# Patient Record
Sex: FEMALE | Race: WHITE | Employment: OTHER | URBAN - METROPOLITAN AREA
[De-identification: names, ages, dates, MRNs, and addresses within clinical notes are randomized per-mention and may not be internally consistent; named-entity substitution may affect disease eponyms.]

---

## 2019-11-04 ENCOUNTER — TELEPHONE (OUTPATIENT)
Dept: GASTROENTEROLOGY | Age: 84
End: 2019-11-04

## 2019-11-04 DIAGNOSIS — R19.4 CHANGE IN BOWEL HABIT: Primary | ICD-10-CM

## 2019-11-04 RX ORDER — SODIUM, POTASSIUM,MAG SULFATES 17.5-3.13G
SOLUTION, RECONSTITUTED, ORAL ORAL
Qty: 2 BOTTLE | Refills: 0 | Status: ON HOLD | OUTPATIENT
Start: 2019-11-04 | End: 2019-11-20 | Stop reason: HOSPADM

## 2019-11-20 ENCOUNTER — ANESTHESIA EVENT (OUTPATIENT)
Dept: OPERATING ROOM | Age: 84
End: 2019-11-20
Payer: MEDICARE

## 2019-11-20 ENCOUNTER — ANESTHESIA (OUTPATIENT)
Dept: OPERATING ROOM | Age: 84
End: 2019-11-20
Payer: MEDICARE

## 2019-11-20 ENCOUNTER — HOSPITAL ENCOUNTER (OUTPATIENT)
Age: 84
Setting detail: OUTPATIENT SURGERY
Discharge: HOME | End: 2019-11-20
Attending: INTERNAL MEDICINE | Admitting: INTERNAL MEDICINE
Payer: MEDICARE

## 2019-11-20 VITALS
OXYGEN SATURATION: 97 % | RESPIRATION RATE: 22 BRPM | DIASTOLIC BLOOD PRESSURE: 58 MMHG | SYSTOLIC BLOOD PRESSURE: 115 MMHG

## 2019-11-20 VITALS
BODY MASS INDEX: 21.97 KG/M2 | HEART RATE: 89 BPM | DIASTOLIC BLOOD PRESSURE: 64 MMHG | WEIGHT: 109 LBS | OXYGEN SATURATION: 98 % | RESPIRATION RATE: 18 BRPM | HEIGHT: 59 IN | TEMPERATURE: 97.8 F | SYSTOLIC BLOOD PRESSURE: 114 MMHG

## 2019-11-20 LAB — GLUCOSE BLD-MCNC: 164 MG/DL (ref 74–100)

## 2019-11-20 PROCEDURE — 2709999900 HC NON-CHARGEABLE SUPPLY: Performed by: INTERNAL MEDICINE

## 2019-11-20 PROCEDURE — 7100000011 HC PHASE II RECOVERY - ADDTL 15 MIN: Performed by: INTERNAL MEDICINE

## 2019-11-20 PROCEDURE — 45380 COLONOSCOPY AND BIOPSY: CPT | Performed by: INTERNAL MEDICINE

## 2019-11-20 PROCEDURE — 2500000003 HC RX 250 WO HCPCS: Performed by: NURSE ANESTHETIST, CERTIFIED REGISTERED

## 2019-11-20 PROCEDURE — 82947 ASSAY GLUCOSE BLOOD QUANT: CPT

## 2019-11-20 PROCEDURE — 88305 TISSUE EXAM BY PATHOLOGIST: CPT

## 2019-11-20 PROCEDURE — 2580000003 HC RX 258: Performed by: INTERNAL MEDICINE

## 2019-11-20 PROCEDURE — 7100000010 HC PHASE II RECOVERY - FIRST 15 MIN: Performed by: INTERNAL MEDICINE

## 2019-11-20 PROCEDURE — 3700000000 HC ANESTHESIA ATTENDED CARE: Performed by: INTERNAL MEDICINE

## 2019-11-20 PROCEDURE — 3700000001 HC ADD 15 MINUTES (ANESTHESIA): Performed by: INTERNAL MEDICINE

## 2019-11-20 PROCEDURE — 3609010600 HC COLONOSCOPY POLYPECTOMY SNARE/COLD BIOPSY: Performed by: INTERNAL MEDICINE

## 2019-11-20 PROCEDURE — 6360000002 HC RX W HCPCS: Performed by: NURSE ANESTHETIST, CERTIFIED REGISTERED

## 2019-11-20 RX ORDER — PROPOFOL 10 MG/ML
INJECTION, EMULSION INTRAVENOUS CONTINUOUS PRN
Status: DISCONTINUED | OUTPATIENT
Start: 2019-11-20 | End: 2019-11-20 | Stop reason: SDUPTHER

## 2019-11-20 RX ORDER — LIDOCAINE HYDROCHLORIDE 20 MG/ML
INJECTION, SOLUTION EPIDURAL; INFILTRATION; INTRACAUDAL; PERINEURAL PRN
Status: DISCONTINUED | OUTPATIENT
Start: 2019-11-20 | End: 2019-11-20 | Stop reason: SDUPTHER

## 2019-11-20 RX ORDER — SODIUM CHLORIDE, SODIUM LACTATE, POTASSIUM CHLORIDE, CALCIUM CHLORIDE 600; 310; 30; 20 MG/100ML; MG/100ML; MG/100ML; MG/100ML
INJECTION, SOLUTION INTRAVENOUS CONTINUOUS
Status: DISCONTINUED | OUTPATIENT
Start: 2019-11-20 | End: 2019-11-20 | Stop reason: HOSPADM

## 2019-11-20 RX ORDER — PROPOFOL 10 MG/ML
INJECTION, EMULSION INTRAVENOUS PRN
Status: DISCONTINUED | OUTPATIENT
Start: 2019-11-20 | End: 2019-11-20 | Stop reason: SDUPTHER

## 2019-11-20 RX ADMIN — SODIUM CHLORIDE, POTASSIUM CHLORIDE, SODIUM LACTATE AND CALCIUM CHLORIDE: 600; 310; 30; 20 INJECTION, SOLUTION INTRAVENOUS at 11:02

## 2019-11-20 RX ADMIN — PROPOFOL 40 MG: 10 INJECTION, EMULSION INTRAVENOUS at 12:20

## 2019-11-20 RX ADMIN — PROPOFOL 150 MCG/KG/MIN: 10 INJECTION, EMULSION INTRAVENOUS at 12:20

## 2019-11-20 RX ADMIN — LIDOCAINE HYDROCHLORIDE 50 MG: 20 INJECTION, SOLUTION EPIDURAL; INFILTRATION; INTRACAUDAL at 12:20

## 2019-11-20 ASSESSMENT — PAIN SCALES - GENERAL
PAINLEVEL_OUTOF10: 0

## 2019-11-22 LAB — SURGICAL PATHOLOGY REPORT: NORMAL

## 2020-01-14 ENCOUNTER — OFFICE VISIT (OUTPATIENT)
Dept: GASTROENTEROLOGY | Age: 85
End: 2020-01-14
Payer: MEDICARE

## 2020-01-14 VITALS
BODY MASS INDEX: 19.8 KG/M2 | WEIGHT: 98.2 LBS | RESPIRATION RATE: 18 BRPM | SYSTOLIC BLOOD PRESSURE: 129 MMHG | HEART RATE: 75 BPM | DIASTOLIC BLOOD PRESSURE: 78 MMHG | HEIGHT: 59 IN | TEMPERATURE: 98.7 F

## 2020-01-14 PROCEDURE — 1090F PRES/ABSN URINE INCON ASSESS: CPT | Performed by: INTERNAL MEDICINE

## 2020-01-14 PROCEDURE — 99215 OFFICE O/P EST HI 40 MIN: CPT | Performed by: INTERNAL MEDICINE

## 2020-01-14 PROCEDURE — 1036F TOBACCO NON-USER: CPT | Performed by: INTERNAL MEDICINE

## 2020-01-14 PROCEDURE — G8400 PT W/DXA NO RESULTS DOC: HCPCS | Performed by: INTERNAL MEDICINE

## 2020-01-14 PROCEDURE — G8482 FLU IMMUNIZE ORDER/ADMIN: HCPCS | Performed by: INTERNAL MEDICINE

## 2020-01-14 PROCEDURE — G8427 DOCREV CUR MEDS BY ELIG CLIN: HCPCS | Performed by: INTERNAL MEDICINE

## 2020-01-14 PROCEDURE — G8420 CALC BMI NORM PARAMETERS: HCPCS | Performed by: INTERNAL MEDICINE

## 2020-01-14 PROCEDURE — 1123F ACP DISCUSS/DSCN MKR DOCD: CPT | Performed by: INTERNAL MEDICINE

## 2020-01-14 PROCEDURE — 4040F PNEUMOC VAC/ADMIN/RCVD: CPT | Performed by: INTERNAL MEDICINE

## 2020-01-14 RX ORDER — SIMETHICONE 80 MG
80 TABLET,CHEWABLE ORAL
COMMUNITY
End: 2021-07-06

## 2020-01-14 RX ORDER — DICYCLOMINE HYDROCHLORIDE 10 MG/1
10 CAPSULE ORAL 2 TIMES DAILY PRN
COMMUNITY
End: 2021-07-06

## 2020-01-14 NOTE — PROGRESS NOTES
except as mentioned above, was negative or noncontributory  Allergy and Immunology:  Completed and, except as mentioned above, was negative or noncontributory  Hematological and Lymphatic:  Completed and, except as mentioned above, was negative or noncontributory  Endocrine: Completed and, except as mentioned above, was negative or noncontributory  Breast:  Completed and, except as mentioned above, was negative or noncontributory  Respiratory:  Completed and, except as mentioned above, was negative or noncontributory  Cardiovascular:  Completed and, except as mentioned above, was negative or noncontributory  Gastrointestinal: See HPI  Genito-Urinary:  Completed and, except as mentioned above, was negative or noncontributory  Musculoskeletal:  Completed and, except as mentioned above, was negative or noncontributory  Neurological:  Completed and, except as mentioned above, was negative or noncontributory  Dermatological:  Completed and, except as mentioned above, was negative or noncontributory      Physical exam:  Constitutional - Well-developed, well-nourished, in no acute distress. Vital signs, height and weight are as documented above.     Mental Status /Psychiatric - alert, oriented to person, place, and time, flat affect with depressed appearance, behavior, speech, dress, motor activity, and thought processes    Eyes - pupils equal and reactive, extraocular eye movements intact, sclera anicteric, conjunctiva and eyelids normal    Neck - supple, no significant adenopathy, trachea midline, thyroid not enlarged     Respiratory - clear to auscultation, no wheezes, rales or rhonchi, symmetric air entry, no tachypnea, retractions or cyanosis, no evidence of increased respiratory effort     Cardiovascular - normal rate, regular rhythm, normal S1, S2, no murmurs, rubs, clicks or gallops, normal bilateral carotid upstroke without bruits, no JVD     Gastrointestinal - soft, nontender, nondistended, no masses or organomegaly, bowel sounds normal, no hernias noted     Musculoskeletal - no joint tenderness, deformity or swelling, no muscular tenderness noted, normal range of motion     Extremities - peripheral pulses normal, no pedal edema, no clubbing or cyanosis     Skin - normal coloration and turgor, no rashes, no suspicious skin lesions noted    This lady has multiple gastrointestinal symptoms as described above which could be due to her hiatal hernia, gastroesophageal reflux disease, peptic ulcer disease, neoplasm, partial gastric outlet obstruction or small intestinal pathology. She admits to depression which she attributes to her ongoing health issues but which may be playing a significant role in her symptoms, in my opinion. She also has a number of non-GI symptoms and was noted to have hyponatremia on her blood collection dated 11/18/2019. I will obviously defer any further evaluation in this regard to her PCP. From a GI standpoint, we will go ahead with EGD and CT enterography at this point. Alternatives, risks and benefits of this approach were discussed with the patient who understands and agrees. Please note that portions of this note were completed with a voice recognition program. Efforts were made to edit the dictation but occasionally words are mis-transcribed.

## 2020-01-14 NOTE — PATIENT INSTRUCTIONS
SURVEY:    You may be receiving a survey from [facility] regarding your visit today. Please complete the survey to enable us to provide the highest quality of care to you and your family. If you cannot score us a very good on any question, please call the office to discuss how we could have made your experience a very good one. Thank you.

## 2020-01-17 PROBLEM — R10.10 PAIN OF UPPER ABDOMEN: Status: ACTIVE | Noted: 2020-01-17

## 2020-01-17 PROBLEM — R11.0 NAUSEA: Status: ACTIVE | Noted: 2020-01-17

## 2020-01-17 PROBLEM — R63.4 WEIGHT LOSS: Status: ACTIVE | Noted: 2020-01-17

## 2020-01-20 ENCOUNTER — ANESTHESIA EVENT (OUTPATIENT)
Dept: OPERATING ROOM | Age: 85
End: 2020-01-20
Payer: MEDICARE

## 2020-01-20 ENCOUNTER — ANESTHESIA (OUTPATIENT)
Dept: OPERATING ROOM | Age: 85
End: 2020-01-20
Payer: MEDICARE

## 2020-01-20 ENCOUNTER — HOSPITAL ENCOUNTER (OUTPATIENT)
Age: 85
Setting detail: OUTPATIENT SURGERY
Discharge: HOME | End: 2020-01-20
Attending: INTERNAL MEDICINE | Admitting: INTERNAL MEDICINE
Payer: MEDICARE

## 2020-01-20 VITALS
DIASTOLIC BLOOD PRESSURE: 50 MMHG | WEIGHT: 98 LBS | SYSTOLIC BLOOD PRESSURE: 115 MMHG | OXYGEN SATURATION: 99 % | TEMPERATURE: 97.8 F | HEART RATE: 73 BPM | HEIGHT: 60 IN | RESPIRATION RATE: 16 BRPM | BODY MASS INDEX: 19.24 KG/M2

## 2020-01-20 VITALS
OXYGEN SATURATION: 100 % | SYSTOLIC BLOOD PRESSURE: 143 MMHG | RESPIRATION RATE: 23 BRPM | DIASTOLIC BLOOD PRESSURE: 64 MMHG

## 2020-01-20 LAB — GLUCOSE BLD-MCNC: 117 MG/DL (ref 74–100)

## 2020-01-20 PROCEDURE — 82947 ASSAY GLUCOSE BLOOD QUANT: CPT

## 2020-01-20 PROCEDURE — 7100000011 HC PHASE II RECOVERY - ADDTL 15 MIN: Performed by: INTERNAL MEDICINE

## 2020-01-20 PROCEDURE — 43239 EGD BIOPSY SINGLE/MULTIPLE: CPT | Performed by: INTERNAL MEDICINE

## 2020-01-20 PROCEDURE — 88305 TISSUE EXAM BY PATHOLOGIST: CPT

## 2020-01-20 PROCEDURE — 2580000003 HC RX 258: Performed by: INTERNAL MEDICINE

## 2020-01-20 PROCEDURE — 2709999900 HC NON-CHARGEABLE SUPPLY: Performed by: INTERNAL MEDICINE

## 2020-01-20 PROCEDURE — 3700000001 HC ADD 15 MINUTES (ANESTHESIA): Performed by: INTERNAL MEDICINE

## 2020-01-20 PROCEDURE — 6360000002 HC RX W HCPCS: Performed by: NURSE ANESTHETIST, CERTIFIED REGISTERED

## 2020-01-20 PROCEDURE — 7100000010 HC PHASE II RECOVERY - FIRST 15 MIN: Performed by: INTERNAL MEDICINE

## 2020-01-20 PROCEDURE — 2500000003 HC RX 250 WO HCPCS: Performed by: NURSE ANESTHETIST, CERTIFIED REGISTERED

## 2020-01-20 PROCEDURE — 3700000000 HC ANESTHESIA ATTENDED CARE: Performed by: INTERNAL MEDICINE

## 2020-01-20 PROCEDURE — 3609012400 HC EGD TRANSORAL BIOPSY SINGLE/MULTIPLE: Performed by: INTERNAL MEDICINE

## 2020-01-20 RX ORDER — PROPOFOL 10 MG/ML
INJECTION, EMULSION INTRAVENOUS PRN
Status: DISCONTINUED | OUTPATIENT
Start: 2020-01-20 | End: 2020-01-20 | Stop reason: SDUPTHER

## 2020-01-20 RX ORDER — SODIUM CHLORIDE, SODIUM LACTATE, POTASSIUM CHLORIDE, CALCIUM CHLORIDE 600; 310; 30; 20 MG/100ML; MG/100ML; MG/100ML; MG/100ML
INJECTION, SOLUTION INTRAVENOUS CONTINUOUS
Status: DISCONTINUED | OUTPATIENT
Start: 2020-01-20 | End: 2020-01-20 | Stop reason: HOSPADM

## 2020-01-20 RX ORDER — LIDOCAINE HYDROCHLORIDE 20 MG/ML
INJECTION, SOLUTION EPIDURAL; INFILTRATION; INTRACAUDAL; PERINEURAL PRN
Status: DISCONTINUED | OUTPATIENT
Start: 2020-01-20 | End: 2020-01-20 | Stop reason: SDUPTHER

## 2020-01-20 RX ADMIN — SODIUM CHLORIDE, POTASSIUM CHLORIDE, SODIUM LACTATE AND CALCIUM CHLORIDE: 600; 310; 30; 20 INJECTION, SOLUTION INTRAVENOUS at 08:14

## 2020-01-20 RX ADMIN — PROPOFOL 80 MG: 10 INJECTION, EMULSION INTRAVENOUS at 08:54

## 2020-01-20 RX ADMIN — LIDOCAINE HYDROCHLORIDE 100 MG: 20 INJECTION, SOLUTION EPIDURAL; INFILTRATION; INTRACAUDAL at 08:54

## 2020-01-20 ASSESSMENT — PAIN SCALES - GENERAL
PAINLEVEL_OUTOF10: 0

## 2020-01-20 NOTE — H&P
attempt to quit: 1968     Years since quittin.4    Smokeless tobacco: Never Used   Substance and Sexual Activity    Alcohol use: Yes     Alcohol/week: 1.0 standard drinks     Types: 1 Glasses of wine per week     Comment: rarely    Drug use: No    Sexual activity: Not on file   Lifestyle    Physical activity:     Days per week: Not on file     Minutes per session: Not on file    Stress: Not on file   Relationships    Social connections:     Talks on phone: Not on file     Gets together: Not on file     Attends [employer] service: Not on file     Active member of club or organization: Not on file     Attends meetings of clubs or organizations: Not on file     Relationship status: Not on file    Intimate partner violence:     Fear of current or ex partner: Not on file     Emotionally abused: Not on file     Physically abused: Not on file     Forced sexual activity: Not on file   Other Topics Concern    Not on file   Social History Narrative    Not on file     ROS: Non-contributory    Physical Exam:  Vitals:    20 0800   BP: (!) 128/58   Pulse: 60   Resp: 18   Temp: 98.9 °F (37.2 °C)   SpO2: 99%       Chest: Breath sounds were clear and equal with no rales, wheezes, or rhonchi. Respiratory effort was normal with no retractions or use of accessory muscles. Cardiovascular: Heart sounds were normal with a regular rate and rhythm. There were no murmurs, gallops or rubs. Abdomen: Bowel sounds were normal.  The abdomen was soft and non distended. There was no tenderness, guarding, rebound, or rigidity. There were no masses, hepatosplenomegaly, or hernias.     Plan: egd      Electronically by Desmond Collado MD  on 2020 at 8:34 AM

## 2020-01-20 NOTE — ANESTHESIA PRE PROCEDURE
Department of Anesthesiology  Preprocedure Note       Name:  Gretchen Taveras   Age:  81 y.o.  :  1935                                          MRN:  [id number]         Date:  2020      Surgeon: Raul Munozer):  Bebeto Askew MD    Procedure: EGD ESOPHAGOGASTRODUODENOSCOPY (N/A )    Medications prior to admission:   Prior to Admission medications    Medication Sig Start Date End Date Taking?  Authorizing Provider   dicyclomine (BENTYL) 10 MG capsule Take 10 mg by mouth 2 times daily as needed   Yes Historical Provider, MD   simethicone (MYLICON) 80 MG chewable tablet Take 80 mg by mouth 3 times daily (before meals)   Yes Historical Provider, MD   Probiotic Product (PROBIOTIC-10 PO) Take by mouth daily   Yes Historical Provider, MD   Ondansetron HCl (ZOFRAN PO) Take by mouth as needed   Yes Historical Provider, MD   metFORMIN (GLUCOPHAGE) 1000 MG tablet Take 1,000 mg by mouth 2 times daily   Yes Historical Provider, MD   indapamide (LOZOL) 1.25 MG tablet Take 1.25 mg by mouth daily   Yes Historical Provider, MD   sitaGLIPtin (JANUVIA) 100 MG tablet Take 100 mg by mouth daily   Yes Historical Provider, MD   Omeprazole (PRILOSEC PO) Take 40 mg by mouth 2 times daily    Yes Historical Provider, MD   amLODIPine-benazepril (LOTREL) 5-10 MG per capsule Take 1 capsule by mouth daily   Yes Historical Provider, MD   simvastatin (ZOCOR) 20 MG tablet Take 20 mg by mouth nightly   Yes Historical Provider, MD   metoprolol (LOPRESSOR) 50 MG tablet Take 50 mg by mouth 2 times daily   Yes Historical Provider, MD   psyllium 0.52 g capsule Take 0.52 g by mouth Takes 2 caps daily    Historical Provider, MD   diphenoxylate-atropine (LOMOTIL) 2.5-0.025 MG per tablet Take 1 tablet by mouth 3 times daily as needed for Diarrhea    Historical Provider, MD       Current medications:    Current Facility-Administered Medications   Medication Dose Route Frequency Provider Last Rate Last Dose    lactated ringers infusion   Intravenous blood products discussed with patient whom consented to blood products. Plan discussed with CRNA.                   Diann Kuhn, PONCE - CRNA   1/20/2020

## 2020-01-22 LAB — SURGICAL PATHOLOGY REPORT: NORMAL

## 2020-01-24 ENCOUNTER — HOSPITAL ENCOUNTER (OUTPATIENT)
Dept: CT IMAGING | Age: 85
Discharge: HOME | End: 2020-01-26
Payer: MEDICARE

## 2020-01-24 ENCOUNTER — HOSPITAL ENCOUNTER (OUTPATIENT)
Dept: LAB | Age: 85
Discharge: HOME | End: 2020-01-24
Payer: MEDICARE

## 2020-01-24 LAB
BUN BLDV-MCNC: 12 MG/DL (ref 8–23)
CREAT SERPL-MCNC: 0.83 MG/DL (ref 0.5–0.9)
GFR AFRICAN AMERICAN: >60 ML/MIN
GFR NON-AFRICAN AMERICAN: >60 ML/MIN
GFR SERPL CREATININE-BSD FRML MDRD: NORMAL ML/MIN/{1.73_M2}
GFR SERPL CREATININE-BSD FRML MDRD: NORMAL ML/MIN/{1.73_M2}

## 2020-01-24 PROCEDURE — 82565 ASSAY OF CREATININE: CPT

## 2020-01-24 PROCEDURE — 2500000003 HC RX 250 WO HCPCS: Performed by: INTERNAL MEDICINE

## 2020-01-24 PROCEDURE — 74177 CT ABD & PELVIS W/CONTRAST: CPT

## 2020-01-24 PROCEDURE — 6360000004 HC RX CONTRAST MEDICATION: Performed by: INTERNAL MEDICINE

## 2020-01-24 PROCEDURE — 36415 COLL VENOUS BLD VENIPUNCTURE: CPT

## 2020-01-24 PROCEDURE — 84520 ASSAY OF UREA NITROGEN: CPT

## 2020-01-24 RX ADMIN — BARIUM SULFATE 1300 ML: 1 SUSPENSION ORAL at 13:29

## 2020-01-24 RX ADMIN — IOPAMIDOL 75 ML: 755 INJECTION, SOLUTION INTRAVENOUS at 13:29

## 2020-01-30 ENCOUNTER — HOSPITAL ENCOUNTER (OUTPATIENT)
Dept: GENERAL RADIOLOGY | Age: 85
Discharge: HOME | End: 2020-02-01
Payer: MEDICARE

## 2020-01-30 PROCEDURE — A4641 RADIOPHARM DX AGENT NOC: HCPCS | Performed by: INTERNAL MEDICINE

## 2020-01-30 PROCEDURE — 74240 X-RAY XM UPR GI TRC 1CNTRST: CPT

## 2020-01-30 PROCEDURE — 6360000004 HC RX CONTRAST MEDICATION: Performed by: INTERNAL MEDICINE

## 2020-01-30 RX ADMIN — BARIUM SULFATE 355 ML: 0.6 SUSPENSION ORAL at 10:03

## 2020-02-05 ENCOUNTER — TELEPHONE (OUTPATIENT)
Dept: GASTROENTEROLOGY | Age: 85
End: 2020-02-05

## 2020-02-06 ENCOUNTER — TELEPHONE (OUTPATIENT)
Dept: GASTROENTEROLOGY | Age: 85
End: 2020-02-06

## 2020-02-06 PROBLEM — K44.9 HIATAL HERNIA: Status: ACTIVE | Noted: 2020-02-06

## 2020-02-06 NOTE — TELEPHONE ENCOUNTER
colonoscopy was completed on 11/20/2019 by you. (it is in MD reports) Please see 2-6-20 telephone encounter. I have spoke with patient and she wants an office visit before she decides if she will agree to be evaluated at a tertiary center.

## 2020-02-06 NOTE — TELEPHONE ENCOUNTER
I had forgoten that we needed to look in \"procedures\" for MD-Reports. I will be happy to see her in the office.

## 2020-02-06 NOTE — TELEPHONE ENCOUNTER
I have reviewed both the CT enterography and upper GI series with the only significant abnormality being a large sliding hiatal hernia as was the case with the EGD as well. Generally, these hernias cause symptoms when there is related acid reflux and are not felt to cause other symptoms; however, I have seen some patients with large sliding hiatal hernias who have discomfort which appears to be related to the hernia per se. The only treatment for the hernia itself would be surgical which I am not advocating at this time. We have not found any other etiology for the symptoms but, as we discussed in the office, I do suspect that depression is playing a role. At this point, we could arrange for a second opinion at a tertiary center, if desired, but otherwise I am afraid that I have little to offer.

## 2020-02-11 ENCOUNTER — OFFICE VISIT (OUTPATIENT)
Dept: GASTROENTEROLOGY | Age: 85
End: 2020-02-11
Payer: MEDICARE

## 2020-02-11 VITALS
BODY MASS INDEX: 18.51 KG/M2 | HEIGHT: 60 IN | HEART RATE: 81 BPM | TEMPERATURE: 99.1 F | WEIGHT: 94.3 LBS | SYSTOLIC BLOOD PRESSURE: 120 MMHG | DIASTOLIC BLOOD PRESSURE: 70 MMHG | RESPIRATION RATE: 18 BRPM

## 2020-02-11 PROCEDURE — 4040F PNEUMOC VAC/ADMIN/RCVD: CPT | Performed by: INTERNAL MEDICINE

## 2020-02-11 PROCEDURE — G8482 FLU IMMUNIZE ORDER/ADMIN: HCPCS | Performed by: INTERNAL MEDICINE

## 2020-02-11 PROCEDURE — 1090F PRES/ABSN URINE INCON ASSESS: CPT | Performed by: INTERNAL MEDICINE

## 2020-02-11 PROCEDURE — 1036F TOBACCO NON-USER: CPT | Performed by: INTERNAL MEDICINE

## 2020-02-11 PROCEDURE — 99213 OFFICE O/P EST LOW 20 MIN: CPT | Performed by: INTERNAL MEDICINE

## 2020-02-11 PROCEDURE — G8427 DOCREV CUR MEDS BY ELIG CLIN: HCPCS | Performed by: INTERNAL MEDICINE

## 2020-02-11 PROCEDURE — 99214 OFFICE O/P EST MOD 30 MIN: CPT | Performed by: INTERNAL MEDICINE

## 2020-02-11 PROCEDURE — 1123F ACP DISCUSS/DSCN MKR DOCD: CPT | Performed by: INTERNAL MEDICINE

## 2020-02-11 PROCEDURE — G8420 CALC BMI NORM PARAMETERS: HCPCS | Performed by: INTERNAL MEDICINE

## 2020-02-11 PROCEDURE — G8400 PT W/DXA NO RESULTS DOC: HCPCS | Performed by: INTERNAL MEDICINE

## 2020-02-11 NOTE — PROGRESS NOTES
Chucky Orantes is a 81 y.o. female   YOB: 1935    Blood pressure 120/70, pulse 81, temperature 99.1 °F (37.3 °C), temperature source Temporal, resp. rate 18, height 4' 11.5\" (1.511 m), weight 94 lb 4.8 oz (42.8 kg). Body mass index is 18.73 kg/m². Ms. Gabe Sellers is an elderly woman whom I have advised to get a second opinion from a tertiary center regarding her symptoms. I saw her in the office on 1/14/2020 complaints of diarrhea, weight loss, nausea, eructation, anorexia and abdominal pain as described in my note. Also described in EPIC are my previous encounters with her in 2016. A colonoscopy on 11/20/2019 revealed internal hemorrhoids and left-sided diverticulosis with random biopsies showing no histologic abnormality. EGD on 1/20/2020 revealed a large hiatal hernia without other abnormalities with an antral biopsy showing only mild chronic gastritis without H. pylori. CT enterography on 1/24/2020 showed a large hiatal hernia with questionable small bowel distention, upper GI series confirmed that the hernia was a sliding hiatal hernia. I had mentioned to her on her previous visit that she appeared depressed to me and her daughter subsequently made an appointment for her with a mental care provider which she did not wish to keep and that she feels that her depression is a result of her symptoms. I advised her and her daughters that I felt that the depression was likely a significant component of her problem. I had previously mentioned to them that hiatal hernias are not generally felt to cause symptoms other than those related to associated GERD but that, in my experience, it appears that this is not always the case. There is also the question of the possibly mildly dilated small bowel which may require further evaluation as well as considering other studies including gastric emptying study to rule out gastroparesis.   Prior to embarking on any further evaluation, however, I felt it would be helpful to get another opinion given the vagueness of her symptoms. She is now agreeable to such an evaluation but remains opposed to any psychological evaluation although she did reluctantly state that she would consider that. I have referred her to [facility] for a second opinion and possible further evaluation. Alternatives, risks and benefits of this approach were discussed with the patient who understands and agrees. I spent the entire 25 minute visit counseling and/or coordinating care regarding the above described issues. Past Medical History:   Diagnosis Date    Diabetes mellitus ([facility] [address]     Dysphagia     GERD (gastroesophageal reflux disease)     Hyperlipidemia     Hypertension         Past Surgical History:   Procedure Laterality Date    CARDIAC CATHETERIZATION  2007?  CHOLECYSTECTOMY, LAPAROSCOPIC      COLONOSCOPY  2008    COLONOSCOPY  1/26/2016    -hemorrhoids    COLONOSCOPY  11/20/2019    Dr. Homero Black bx(normal)diverticulosis,hemorrhoids    COLONOSCOPY N/A 11/20/2019    COLONOSCOPY POLYPECTOMY SNARE/COLD BIOPSY performed by Shabnam Sosa MD at [place] [address] Right     TOTAL KNEE ARTHROPLASTY Left     UPPER GASTROINTESTINAL ENDOSCOPY  1/26/2016    -bx,dilation    UPPER GASTROINTESTINAL ENDOSCOPY  01/20/2020    bx(mild chronic gastritis,neg H-Pylori)hiatal hernia    UPPER GASTROINTESTINAL ENDOSCOPY N/A 1/20/2020    EGD BIOPSY performed by Shabnam Sosa MD at [facility] Marital status:       Spouse name: Not on file    Number of children: Not on file    Years of education: Not on file    Highest education level: Not on file   Occupational History    Not on file   Social Needs    Financial resource strain: Not on file    Food insecurity:     Worry: Not on file     Inability: Not on file    Transportation needs:     Medical: Not on file Non-medical: Not on file   Tobacco Use    Smoking status: Former Smoker     Years: 10.00     Last attempt to quit: 1968     Years since quittin.4    Smokeless tobacco: Never Used   Substance and Sexual Activity    Alcohol use:  Yes     Alcohol/week: 1.0 standard drinks     Types: 1 Glasses of wine per week     Comment: rarely    Drug use: No    Sexual activity: Not on file   Lifestyle    Physical activity:     Days per week: Not on file     Minutes per session: Not on file    Stress: Not on file   Relationships    Social connections:     Talks on phone: Not on file     Gets together: Not on file     Attends [employer] service: Not on file     Active member of club or organization: Not on file     Attends meetings of clubs or organizations: Not on file     Relationship status: Not on file    Intimate partner violence:     Fear of current or ex partner: Not on file     Emotionally abused: Not on file     Physically abused: Not on file     Forced sexual activity: Not on file   Other Topics Concern    Not on file   Social History Narrative    Not on file       Family History   Problem Relation Age of Onset    Arthritis Mother     Diabetes Mother     Cancer Mother         uterine    High Blood Pressure Mother     Heart Attack Father     Heart Disease Father     Diabetes Father     COPD Father     Colon Polyps Brother        Outpatient Medications Marked as Taking for the 20 encounter (Office Visit) with Mendy Wade MD   Medication Sig Dispense Refill    dicyclomine (BENTYL) 10 MG capsule Take 10 mg by mouth 2 times daily as needed      simethicone (MYLICON) 80 MG chewable tablet Take 80 mg by mouth 3 times daily (before meals)      Probiotic Product (PROBIOTIC-10 PO) Take by mouth daily      psyllium 0.52 g capsule Take 0.52 g by mouth Takes 2 caps daily      Ondansetron HCl (ZOFRAN PO) Take by mouth as needed      diphenoxylate-atropine (LOMOTIL) 2.5-0.025 MG per tablet

## 2020-12-15 PROBLEM — U07.1 COVID-19: Status: ACTIVE | Noted: 2020-12-15

## 2020-12-16 PROBLEM — E43 SEVERE PROTEIN-CALORIE MALNUTRITION (HCC): Status: ACTIVE | Noted: 2020-12-16

## 2021-01-07 ENCOUNTER — HOSPITAL ENCOUNTER (EMERGENCY)
Age: 86
Discharge: ANOTHER HOSPITAL | End: 2021-01-08
Attending: EMERGENCY MEDICINE
Payer: MEDICARE

## 2021-01-07 ENCOUNTER — APPOINTMENT (OUTPATIENT)
Dept: GENERAL RADIOLOGY | Age: 86
End: 2021-01-07
Payer: MEDICARE

## 2021-01-07 DIAGNOSIS — E87.1 HYPONATREMIA: ICD-10-CM

## 2021-01-07 DIAGNOSIS — K92.2 GASTROINTESTINAL HEMORRHAGE, UNSPECIFIED GASTROINTESTINAL HEMORRHAGE TYPE: Primary | ICD-10-CM

## 2021-01-07 DIAGNOSIS — K92.0 COFFEE GROUND EMESIS: ICD-10-CM

## 2021-01-07 LAB
ABO/RH: NORMAL
ABSOLUTE EOS #: 0.03 K/UL (ref 0–0.44)
ABSOLUTE IMMATURE GRANULOCYTE: 0.11 K/UL (ref 0–0.3)
ABSOLUTE LYMPH #: 1.27 K/UL (ref 1.1–3.7)
ABSOLUTE MONO #: 0.77 K/UL (ref 0.1–1.2)
ALBUMIN SERPL-MCNC: 3.5 G/DL (ref 3.5–5.2)
ALBUMIN/GLOBULIN RATIO: 1.3 (ref 1–2.5)
ALP BLD-CCNC: 48 U/L (ref 35–104)
ALT SERPL-CCNC: 13 U/L (ref 5–33)
ANION GAP SERPL CALCULATED.3IONS-SCNC: 6 MMOL/L (ref 9–17)
ANTIBODY SCREEN: NEGATIVE
ARM BAND NUMBER: NORMAL
AST SERPL-CCNC: 12 U/L
BASOPHILS # BLD: 0 % (ref 0–2)
BASOPHILS ABSOLUTE: 0.03 K/UL (ref 0–0.2)
BILIRUB SERPL-MCNC: 0.39 MG/DL (ref 0.3–1.2)
BUN BLDV-MCNC: 25 MG/DL (ref 8–23)
BUN/CREAT BLD: 44 (ref 9–20)
CALCIUM SERPL-MCNC: 9.5 MG/DL (ref 8.6–10.4)
CHLORIDE BLD-SCNC: 85 MMOL/L (ref 98–107)
CO2: 32 MMOL/L (ref 20–31)
CREAT SERPL-MCNC: 0.57 MG/DL (ref 0.5–0.9)
DIFFERENTIAL TYPE: ABNORMAL
EOSINOPHILS RELATIVE PERCENT: 0 % (ref 1–4)
EXPIRATION DATE: NORMAL
GFR AFRICAN AMERICAN: >60 ML/MIN
GFR NON-AFRICAN AMERICAN: >60 ML/MIN
GFR SERPL CREATININE-BSD FRML MDRD: ABNORMAL ML/MIN/{1.73_M2}
GFR SERPL CREATININE-BSD FRML MDRD: ABNORMAL ML/MIN/{1.73_M2}
GLUCOSE BLD-MCNC: 263 MG/DL (ref 70–99)
HCT VFR BLD CALC: 31.2 % (ref 36.3–47.1)
HEMOGLOBIN: 9.8 G/DL (ref 11.9–15.1)
IMMATURE GRANULOCYTES: 1 %
INR BLD: 0.9
LIPASE: 65 U/L (ref 13–60)
LYMPHOCYTES # BLD: 11 % (ref 24–43)
MCH RBC QN AUTO: 28.9 PG (ref 25.2–33.5)
MCHC RBC AUTO-ENTMCNC: 31.4 G/DL (ref 28.4–34.8)
MCV RBC AUTO: 92 FL (ref 82.6–102.9)
MONOCYTES # BLD: 7 % (ref 3–12)
NRBC AUTOMATED: 0 PER 100 WBC
PARTIAL THROMBOPLASTIN TIME: 33.5 SEC (ref 23.9–33.8)
PDW BLD-RTO: 12.5 % (ref 11.8–14.4)
PLATELET # BLD: 256 K/UL (ref 138–453)
PLATELET ESTIMATE: ABNORMAL
PMV BLD AUTO: 10.6 FL (ref 8.1–13.5)
POTASSIUM SERPL-SCNC: 4.9 MMOL/L (ref 3.7–5.3)
PROTHROMBIN TIME: 12.2 SEC (ref 11.5–14.2)
RBC # BLD: 3.39 M/UL (ref 3.95–5.11)
RBC # BLD: ABNORMAL 10*6/UL
SEG NEUTROPHILS: 81 % (ref 36–65)
SEGMENTED NEUTROPHILS ABSOLUTE COUNT: 9.23 K/UL (ref 1.5–8.1)
SODIUM BLD-SCNC: 123 MMOL/L (ref 135–144)
TOTAL PROTEIN: 6.3 G/DL (ref 6.4–8.3)
TROPONIN INTERP: ABNORMAL
TROPONIN T: ABNORMAL NG/ML
TROPONIN, HIGH SENSITIVITY: 24 NG/L (ref 0–14)
WBC # BLD: 11.4 K/UL (ref 3.5–11.3)
WBC # BLD: ABNORMAL 10*3/UL

## 2021-01-07 PROCEDURE — 6360000002 HC RX W HCPCS: Performed by: EMERGENCY MEDICINE

## 2021-01-07 PROCEDURE — 83690 ASSAY OF LIPASE: CPT

## 2021-01-07 PROCEDURE — 84484 ASSAY OF TROPONIN QUANT: CPT

## 2021-01-07 PROCEDURE — 87086 URINE CULTURE/COLONY COUNT: CPT

## 2021-01-07 PROCEDURE — 71045 X-RAY EXAM CHEST 1 VIEW: CPT

## 2021-01-07 PROCEDURE — 96366 THER/PROPH/DIAG IV INF ADDON: CPT

## 2021-01-07 PROCEDURE — 85610 PROTHROMBIN TIME: CPT

## 2021-01-07 PROCEDURE — 80053 COMPREHEN METABOLIC PANEL: CPT

## 2021-01-07 PROCEDURE — 96375 TX/PRO/DX INJ NEW DRUG ADDON: CPT

## 2021-01-07 PROCEDURE — 86850 RBC ANTIBODY SCREEN: CPT

## 2021-01-07 PROCEDURE — 99284 EMERGENCY DEPT VISIT MOD MDM: CPT

## 2021-01-07 PROCEDURE — 36415 COLL VENOUS BLD VENIPUNCTURE: CPT

## 2021-01-07 PROCEDURE — 86901 BLOOD TYPING SEROLOGIC RH(D): CPT

## 2021-01-07 PROCEDURE — 85025 COMPLETE CBC W/AUTO DIFF WBC: CPT

## 2021-01-07 PROCEDURE — C9113 INJ PANTOPRAZOLE SODIUM, VIA: HCPCS | Performed by: EMERGENCY MEDICINE

## 2021-01-07 PROCEDURE — 85730 THROMBOPLASTIN TIME PARTIAL: CPT

## 2021-01-07 PROCEDURE — 2580000003 HC RX 258: Performed by: EMERGENCY MEDICINE

## 2021-01-07 PROCEDURE — 96365 THER/PROPH/DIAG IV INF INIT: CPT

## 2021-01-07 PROCEDURE — 86900 BLOOD TYPING SEROLOGIC ABO: CPT

## 2021-01-07 RX ORDER — INSULIN GLARGINE 100 [IU]/ML
6 INJECTION, SOLUTION SUBCUTANEOUS NIGHTLY
COMMUNITY
End: 2021-07-06

## 2021-01-07 RX ORDER — MAGNESIUM HYDROXIDE/ALUMINUM HYDROXICE/SIMETHICONE 120; 1200; 1200 MG/30ML; MG/30ML; MG/30ML
5 SUSPENSION ORAL EVERY 6 HOURS PRN
COMMUNITY
End: 2021-07-06

## 2021-01-07 RX ORDER — ONDANSETRON 2 MG/ML
4 INJECTION INTRAMUSCULAR; INTRAVENOUS ONCE
Status: COMPLETED | OUTPATIENT
Start: 2021-01-07 | End: 2021-01-07

## 2021-01-07 RX ORDER — 0.9 % SODIUM CHLORIDE 0.9 %
1000 INTRAVENOUS SOLUTION INTRAVENOUS ONCE
Status: COMPLETED | OUTPATIENT
Start: 2021-01-07 | End: 2021-01-07

## 2021-01-07 RX ORDER — MIRTAZAPINE 15 MG/1
15 TABLET, FILM COATED ORAL NIGHTLY
COMMUNITY

## 2021-01-07 RX ORDER — SUCRALFATE 1 G/1
1 TABLET ORAL 4 TIMES DAILY
COMMUNITY

## 2021-01-07 RX ORDER — PANTOPRAZOLE SODIUM 40 MG/10ML
INJECTION, POWDER, LYOPHILIZED, FOR SOLUTION INTRAVENOUS
Status: DISCONTINUED
Start: 2021-01-07 | End: 2021-01-08 | Stop reason: HOSPADM

## 2021-01-07 RX ORDER — ATORVASTATIN CALCIUM 10 MG/1
10 TABLET, FILM COATED ORAL DAILY
COMMUNITY

## 2021-01-07 RX ADMIN — SODIUM CHLORIDE 1000 ML: 9 INJECTION, SOLUTION INTRAVENOUS at 21:10

## 2021-01-07 RX ADMIN — SODIUM CHLORIDE 8 MG/HR: 9 INJECTION, SOLUTION INTRAVENOUS at 21:48

## 2021-01-07 RX ADMIN — ONDANSETRON 4 MG: 2 INJECTION INTRAMUSCULAR; INTRAVENOUS at 21:12

## 2021-01-07 RX ADMIN — SODIUM CHLORIDE 80 MG: 9 INJECTION, SOLUTION INTRAVENOUS at 21:13

## 2021-01-08 VITALS
SYSTOLIC BLOOD PRESSURE: 103 MMHG | HEART RATE: 88 BPM | HEIGHT: 60 IN | RESPIRATION RATE: 16 BRPM | BODY MASS INDEX: 15.71 KG/M2 | TEMPERATURE: 98.4 F | WEIGHT: 80 LBS | OXYGEN SATURATION: 99 % | DIASTOLIC BLOOD PRESSURE: 41 MMHG

## 2021-01-08 ASSESSMENT — ENCOUNTER SYMPTOMS
SHORTNESS OF BREATH: 0
NAUSEA: 1
BACK PAIN: 0
COLOR CHANGE: 0
WHEEZING: 0
VOMITING: 1
ABDOMINAL PAIN: 0
RHINORRHEA: 0

## 2021-01-08 NOTE — ED NOTES
[facility] called back with GI from [facility], connected call to Dr Jessica Ortez  01/07/21 1090

## 2021-01-08 NOTE — ED NOTES
Pt and family now want to go to [facility].  Called [facility], they will call [facility] transfer center     Matilda Duverney  01/07/21 [id number]

## 2021-01-08 NOTE — ED NOTES
Pt accepted at [facility], waiting for MARCO ANTONIO Moses  01/07/21 2719

## 2021-01-08 NOTE — ED NOTES
[facility] called with Dr Mello Sheets from [facility] on the line, connected call to Dr Tay Ser  01/07/21 7507

## 2021-01-08 NOTE — ED NOTES
[facility] for transfer to [facility] per pt preference.  They will call [facility] for hospitalist. Waiting for call back     Ismael Ann  01/07/21 [id number]

## 2021-01-08 NOTE — ED PROVIDER NOTES
[facility] ED  Emergency Department Encounter  EmergencyMedicine Attending     Pt Anirudh Nolen  MRN: [id number]  [place] 1935  Date of evaluation: 1/7/21  PCP:  Terri Simpson MD    [address] [place] [address]       Chief Complaint   Patient presents with    Hematemesis     large amount of coffee ground emesis around 1930 per [facility]. had a small amout of coffee ground emesis yesterday. HISTORY OF PRESENT ILLNESS  (Location/Symptom, Timing/Onset, Context/Setting, Quality, Duration, Modifying Factors, Severity.)      Antoine Singleton is a 81 y.o. female who presents with coffee-ground emesis. Patient had a episode of coffee-ground emesis yesterday and then 1 episode today. No significant abdominal pain, some nausea, no history of liver disease, no history of cirrhosis that she knows of. No history of ulcers that she knows of either. Recently had Covid and was hospitalized for extended period of time and was recently discharged and sent to [facility] after discharge. No chest pain shortness of breath difficulty breathing, no cough congestion runny nose anymore, however does feel some continued fatigue especially now that she is having coffee-ground emesis. She denies any melena, denies any blood in stool. No anticoagulation reported by the patient. PAST MEDICAL / SURGICAL / SOCIAL / FAMILY HISTORY      has a past medical history of Arthritis, Diabetes mellitus ([address] Dysphagia, GERD (gastroesophageal reflux disease), HTN (hypertension), Hyperlipidemia, Hypertension, and Type 2 diabetes mellitus ([address] has a past surgical history that includes Total knee arthroplasty (Left); Rotator cuff repair (Right); partial hysterectomy (cervix not removed); Cholecystectomy, laparoscopic; Colonoscopy (2008); Colonoscopy (1/26/2016); Upper gastrointestinal endoscopy (1/26/2016); Cardiac catheterization (2007?); Colonoscopy (11/20/2019); Colonoscopy (N/A, 11/20/2019);  Upper gastrointestinal endoscopy (2020); and Upper gastrointestinal endoscopy (N/A, 2020). Social History     Socioeconomic History    Marital status:      Spouse name: Not on file    Number of children: Not on file    Years of education: Not on file    Highest education level: Not on file   Occupational History    Not on file   Social Needs    Financial resource strain: Not on file    Food insecurity     Worry: Not on file     Inability: Not on file    Transportation needs     Medical: Not on file     Non-medical: Not on file   Tobacco Use    Smoking status: Former Smoker     Years: 10.00     Quit date: 1968     Years since quittin.3    Smokeless tobacco: Never Used   Substance and Sexual Activity    Alcohol use: Yes     Alcohol/week: 1.0 standard drinks     Types: 1 Glasses of wine per week     Comment: rarely    Drug use: No    Sexual activity: Not on file   Lifestyle    Physical activity     Days per week: Not on file     Minutes per session: Not on file    Stress: Not on file   Relationships    Social connections     Talks on phone: Not on file     Gets together: Not on file     Attends [employer] service: Not on file     Active member of club or organization: Not on file     Attends meetings of clubs or organizations: Not on file     Relationship status: Not on file    Intimate partner violence     Fear of current or ex partner: Not on file     Emotionally abused: Not on file     Physically abused: Not on file     Forced sexual activity: Not on file   Other Topics Concern    Not on file   Social History Narrative    Not on file       Family History   Problem Relation Age of Onset    Arthritis Mother     Diabetes Mother     Cancer Mother         uterine    High Blood Pressure Mother     Heart Attack Father     Heart Disease Father     Diabetes Father     COPD Father     Colon Polyps Brother        Allergies:  Patient has no known allergies.     Home Medications:  Prior to Admission medications    Medication Sig Start Date End Date Taking?  Authorizing Provider   atorvastatin (LIPITOR) 10 MG tablet Take 10 mg by mouth daily   Yes Historical Provider, MD   insulin glargine (LANTUS) 100 UNIT/ML injection vial Inject 6 Units into the skin nightly   Yes Historical Provider, MD   mirtazapine (REMERON) 15 MG tablet Take 15 mg by mouth nightly   Yes Historical Provider, MD   linagliptin (TRADJENTA) 5 MG tablet Take 5 mg by mouth daily   Yes Historical Provider, MD   sucralfate (CARAFATE) 1 GM tablet Take 1 g by mouth 4 times daily   Yes Historical Provider, MD   Nutritional Supplements (OSMOLITE 1.2 COLEMAN PO) Take 40 mL/hr by mouth 4 times daily   Yes Historical Provider, MD   aluminum & magnesium hydroxide-simethicone (MAALOX) 200-200-20 MG/5ML SUSP suspension Take 5 mLs by mouth every 6 hours as needed for Indigestion   Yes Historical Provider, MD   Vitamin D (CHOLECALCIFEROL) 50 MCG (2000 UT) TABS tablet Take 1 tablet by mouth daily 12/18/20  Yes Particia Cava, DO   metFORMIN (GLUCOPHAGE) 1000 MG tablet Take 1,000 mg by mouth 2 times daily   Yes Historical Provider, MD   indapamide (LOZOL) 1.25 MG tablet Take 1.25 mg by mouth daily   Yes Historical Provider, MD   Omeprazole (PRILOSEC PO) Take 20 mg by mouth daily    Yes Historical Provider, MD   amLODIPine-benazepril (LOTREL) 5-10 MG per capsule Take 1 capsule by mouth daily   Yes Historical Provider, MD   metoprolol (LOPRESSOR) 50 MG tablet Take 50 mg by mouth 2 times daily   Yes Historical Provider, MD   benzocaine-menthol (CEPACOL SORE THROAT) 15-3.6 MG lozenge Take 1 lozenge by mouth every 2 hours as needed for Sore Throat 12/17/20   Particia Cava, DO   dicyclomine (BENTYL) 10 MG capsule Take 10 mg by mouth 2 times daily as needed    Historical Provider, MD   simethicone (MYLICON) 80 MG chewable tablet Take 80 mg by mouth 3 times daily (before meals)    Historical Provider, MD   Probiotic Product (PROBIOTIC-10 PO) Take by mouth daily Historical Provider, MD   psyllium 0.52 g capsule Take 0.52 g by mouth Takes 2 caps daily    Historical Provider, MD   diphenoxylate-atropine (LOMOTIL) 2.5-0.025 MG per tablet Take 1 tablet by mouth 3 times daily as needed for Diarrhea    Historical Provider, MD   sitaGLIPtin (JANUVIA) 100 MG tablet Take 100 mg by mouth daily    Historical Provider, MD   simvastatin (ZOCOR) 20 MG tablet Take 20 mg by mouth nightly    Historical Provider, MD       REVIEW OF SYSTEMS    (2-9 systems for level 4, 10 or more for level 5)      Review of Systems   Constitutional: Negative for chills and fever. HENT: Negative for congestion and rhinorrhea. Respiratory: Negative for shortness of breath and wheezing. Cardiovascular: Negative for chest pain and leg swelling. Gastrointestinal: Positive for nausea and vomiting. Negative for abdominal pain.        + Coffee ground emesis   Genitourinary: Negative for dysuria. Musculoskeletal: Negative for back pain. Skin: Negative for color change. Neurological: Negative for weakness and headaches. Psychiatric/Behavioral: Negative for agitation. PHYSICAL EXAM   (up to 7 for level 4, 8 or more for level 5)      INITIAL VITALS:   BP (!) 103/41   Pulse 88   Temp 98.4 °F (36.9 °C) (Oral)   Resp 16   Ht 5' (1.524 m)   Wt 80 lb (36.3 kg)   SpO2 99%   BMI 15.62 kg/m²     Physical Exam  Vitals signs reviewed. Constitutional:       General: She is not in acute distress. Appearance: She is well-developed. HENT:      Head: Normocephalic and atraumatic. Mouth/Throat:      Pharynx: Oropharynx is clear. No oropharyngeal exudate or posterior oropharyngeal erythema. Eyes:      General:         Right eye: No discharge. Left eye: No discharge. Conjunctiva/sclera: Conjunctivae normal.   Cardiovascular:      Rate and Rhythm: Normal rate and regular rhythm. Heart sounds: Normal heart sounds. No murmur. No friction rub. No gallop.     Pulmonary: Effort: Pulmonary effort is normal. No respiratory distress. Breath sounds: Normal breath sounds. No wheezing or rales. Abdominal:      General: There is no distension. Palpations: Abdomen is soft. Tenderness: There is no abdominal tenderness. There is no guarding or rebound. Musculoskeletal:         General: No swelling or tenderness. Comments: no calf tenderness on examination bilaterally. Skin:     General: Skin is warm. Findings: No erythema. Neurological:      Mental Status: She is alert.          DIFFERENTIAL  DIAGNOSIS     PLAN (LABS / IMAGING / EKG):  Orders Placed This Encounter   Procedures    XR CHEST PORTABLE    CBC Auto Differential    Comprehensive Metabolic Panel w/ Reflex to MG    Lipase    Troponin    Protime-INR    APTT    TYPE AND SCREEN       MEDICATIONS ORDERED:  Orders Placed This Encounter   Medications    0.9 % sodium chloride bolus    pantoprazole (PROTONIX) 80 mg in sodium chloride 0.9 % 50 mL bolus    ondansetron (ZOFRAN) injection 4 mg    DISCONTD: pantoprazole (PROTONIX) 40 MG injection     BETHEL OSWALDANN: cabinet override    DISCONTD: pantoprazole (PROTONIX) 80 mg in sodium chloride 0.9 % 100 mL infusion    DISCONTD: pantoprazole (PROTONIX) 40 MG injection     BETHEL OSWALDANN: cabinet override       DDX: Gastritis versus ulcer versus upper GI bleed versus lower GI bleed versus diverticulosis     DIAGNOSTIC RESULTS / EMERGENCY DEPARTMENT COURSE / MDM   :  Results for orders placed or performed during the hospital encounter of 01/07/21   CBC Auto Differential   Result Value Ref Range    WBC 11.4 (H) 3.5 - 11.3 k/uL    RBC 3.39 (L) 3.95 - 5.11 m/uL    Hemoglobin 9.8 (L) 11.9 - 15.1 g/dL    Hematocrit 31.2 (L) 36.3 - 47.1 %    MCV 92.0 82.6 - 102.9 fL    MCH 28.9 25.2 - 33.5 pg    MCHC 31.4 28.4 - 34.8 g/dL    RDW 12.5 11.8 - 14.4 %    Platelets 227 473 - 709 k/uL    MPV 10.6 8.1 - 13.5 fL    NRBC Automated 0.0 0.0 per 100 WBC    Differential Type NOT REPORTED     Seg Neutrophils 81 (H) 36 - 65 %    Lymphocytes 11 (L) 24 - 43 %    Monocytes 7 3 - 12 %    Eosinophils % 0 (L) 1 - 4 %    Basophils 0 0 - 2 %    Immature Granulocytes 1 (H) 0 %    Segs Absolute 9.23 (H) 1.50 - 8.10 k/uL    Absolute Lymph # 1.27 1.10 - 3.70 k/uL    Absolute Mono # 0.77 0.10 - 1.20 k/uL    Absolute Eos # 0.03 0.00 - 0.44 k/uL    Basophils Absolute 0.03 0.00 - 0.20 k/uL    Absolute Immature Granulocyte 0.11 0.00 - 0.30 k/uL    WBC Morphology NOT REPORTED     RBC Morphology NOT REPORTED     Platelet Estimate NOT REPORTED    Comprehensive Metabolic Panel w/ Reflex to MG   Result Value Ref Range    Glucose 263 (H) 70 - 99 mg/dL    BUN 25 (H) 8 - 23 mg/dL    CREATININE 0.57 0.50 - 0.90 mg/dL    Bun/Cre Ratio 44 (H) 9 - 20    Calcium 9.5 8.6 - 10.4 mg/dL    Sodium 123 (L) 135 - 144 mmol/L    Potassium 4.9 3.7 - 5.3 mmol/L    Chloride 85 (L) 98 - 107 mmol/L    CO2 32 (H) 20 - 31 mmol/L    Anion Gap 6 (L) 9 - 17 mmol/L    Alkaline Phosphatase 48 35 - 104 U/L    ALT 13 5 - 33 U/L    AST 12 <32 U/L    Total Bilirubin 0.39 0.3 - 1.2 mg/dL    Total Protein 6.3 (L) 6.4 - 8.3 g/dL    Alb 3.5 3.5 - 5.2 g/dL    Albumin/Globulin Ratio 1.3 1.0 - 2.5    GFR Non-African American >60 >60 mL/min    GFR African American >60 >60 mL/min    GFR Comment          GFR Staging         Lipase   Result Value Ref Range    Lipase 65 (H) 13 - 60 U/L   Troponin   Result Value Ref Range    Troponin, High Sensitivity 24 (H) 0 - 14 ng/L    Troponin T NOT REPORTED <0.03 ng/mL    Troponin Interp NOT REPORTED    Protime-INR   Result Value Ref Range    Protime 12.2 11.5 - 14.2 sec    INR 0.9    APTT   Result Value Ref Range    PTT 33.5 23.9 - 33.8 sec   TYPE AND SCREEN   Result Value Ref Range    Expiration Date 01/10/2021,2359     Arm Band Number [id number]     ABO/Rh A POSITIVE     Antibody Screen NEGATIVE        IMPRESSION: 72-year-old female who presents to the emergency department secondary to coffee-ground emesis, concern for upper GI bleeding, hemoglobin is dropped compared to her baseline, 1 episode of coffee-ground emesis prior to arrival, started on Protonix bolus as well as Protonix infusion. Full abdominal work-up otherwise done, hemodynamically stable therefore no transfusion indicated at this time however patient will require GI evaluation as well as admission for hyponatremia. There is no GI available here, patient would like to be transferred to [facility] where she is established and has her hematologist.    RADIOLOGY:    Xr Chest Portable    Result Date: 1/7/2021  EXAMINATION: ONE XRAY VIEW OF THE CHEST 1/7/2021 9:01 pm COMPARISON: None. HISTORY: ORDERING SYSTEM PROVIDED HISTORY: upright, r/o air under diaphragm. Coffee ground emesis. TECHNOLOGIST PROVIDED HISTORY: upright, r/o air under diaphragm. Coffee ground emesis. FINDINGS: The lungs are without acute focal process. There is no effusion or pneumothorax. The cardiomediastinal silhouette is without acute process. The osseous structures are without acute process. No acute process. G-tube noted incidentally. Xr Chest Portable    Result Date: 12/23/2020  EXAMINATION: XR CHEST PORTABLE REASON FOR EXAM: Covid + TECH NOTES: Covid pos jlw COMPARISON: 12/15/2020 is the comparison. TECHNIQUE: Single view. FINDINGS: The cardiomediastinal shadows are normal.. There is a hiatal hernia. No acute infiltrate, edema or effusion. Calcified granuloma seen in the right upper lobe. Report electronically signed by: Dr. Tatum Webster    No acute or focal cardiopulmonary findings. Xr Chest Portable    Result Date: 12/15/2020  PROCEDURE: XR CHEST PORTABLE REASON FOR STUDY/CLINICAL HISTORY: fever. COMPARISON STUDY: 4/7/2011 TECHNIQUE: Single view(s) of the chest presented for interpretation. FINDINGS: No acute cardiopulmonary process. There are chronic interstitial changes noted. Scattered sequela of prior granulomatous disease is noted.  Elevation of the right hemidiaphragm is again suggested. Stable cardiomediastinal silhouette. No focal consolidation, edema, or effusion. No pneumothorax. No acute appearing focal significant bony abnormality. Report electronically signed by: Dr. Nicky Cancino    No acute localizing pulmonary pathology. EKG  None    All EKG's are interpreted by the Emergency Department Physician who either signs or Co-signs this chart in the absence of a cardiologist.    EMERGENCY DEPARTMENT COURSE:    Patient would like to be transferred to [facility], will transfer there due to patient preference. Discussed with Dr. Linda Prather there, patient accepted for transfer there. I also talked with the GI physician at [facility] the understand the patient will be arriving there with a plan of doing EGD in the morning. PROCEDURES:  None    CONSULTS:  None    CRITICAL CARE:  None    FINAL IMPRESSION      1. Gastrointestinal hemorrhage, unspecified gastrointestinal hemorrhage type    2. Coffee ground emesis    3. Hyponatremia          DISPOSITION / PLAN     DISPOSITION Decision To Transfer 01/07/2021 09:35:32 PM      PATIENT REFERRED TO:  No follow-up provider specified.     DISCHARGE MEDICATIONS:  Discharge Medication List as of 1/8/2021  1:05 AM          Claude Grooms, MD  Emergency Medicine Attending    (Please note that portions of thisnote were completed with a voice recognition program.  Efforts were made to edit the dictations but occasionally words are mis-transcribed.)        Claude Grooms, MD  01/08/21 2028

## 2021-01-11 ENCOUNTER — HOSPITAL ENCOUNTER (OUTPATIENT)
Age: 86
Setting detail: SPECIMEN
Discharge: HOME | End: 2021-01-11
Payer: MEDICARE

## 2021-01-18 ENCOUNTER — HOSPITAL ENCOUNTER (OUTPATIENT)
Age: 86
Setting detail: SPECIMEN
Discharge: HOME | End: 2021-01-18
Payer: MEDICARE

## 2021-01-18 LAB
ANION GAP SERPL CALCULATED.3IONS-SCNC: 5 MMOL/L (ref 9–17)
BUN BLDV-MCNC: 17 MG/DL (ref 8–23)
BUN/CREAT BLD: 44 (ref 9–20)
CALCIUM SERPL-MCNC: 8.9 MG/DL (ref 8.6–10.4)
CHLORIDE BLD-SCNC: 99 MMOL/L (ref 98–107)
CO2: 29 MMOL/L (ref 20–31)
CREAT SERPL-MCNC: 0.39 MG/DL (ref 0.5–0.9)
GFR AFRICAN AMERICAN: >60 ML/MIN
GFR NON-AFRICAN AMERICAN: >60 ML/MIN
GFR SERPL CREATININE-BSD FRML MDRD: ABNORMAL ML/MIN/{1.73_M2}
GFR SERPL CREATININE-BSD FRML MDRD: ABNORMAL ML/MIN/{1.73_M2}
GLUCOSE BLD-MCNC: 238 MG/DL (ref 70–99)
HCT VFR BLD CALC: 27.5 % (ref 36.3–47.1)
HEMOGLOBIN: 8.6 G/DL (ref 11.9–15.1)
MCH RBC QN AUTO: 29.9 PG (ref 25.2–33.5)
MCHC RBC AUTO-ENTMCNC: 31.3 G/DL (ref 28.4–34.8)
MCV RBC AUTO: 95.5 FL (ref 82.6–102.9)
NRBC AUTOMATED: 0 PER 100 WBC
PDW BLD-RTO: 13.5 % (ref 11.8–14.4)
PLATELET # BLD: 343 K/UL (ref 138–453)
PMV BLD AUTO: 9.9 FL (ref 8.1–13.5)
POTASSIUM SERPL-SCNC: 4.1 MMOL/L (ref 3.7–5.3)
RBC # BLD: 2.88 M/UL (ref 3.95–5.11)
SODIUM BLD-SCNC: 133 MMOL/L (ref 135–144)
WBC # BLD: 5.6 K/UL (ref 3.5–11.3)

## 2021-01-18 PROCEDURE — P9603 ONE-WAY ALLOW PRORATED MILES: HCPCS

## 2021-01-18 PROCEDURE — 36415 COLL VENOUS BLD VENIPUNCTURE: CPT

## 2021-01-18 PROCEDURE — 80048 BASIC METABOLIC PNL TOTAL CA: CPT

## 2021-01-18 PROCEDURE — 85027 COMPLETE CBC AUTOMATED: CPT

## 2021-01-20 ENCOUNTER — HOSPITAL ENCOUNTER (OUTPATIENT)
Age: 86
Setting detail: SPECIMEN
Discharge: HOME | End: 2021-01-20
Payer: MEDICARE

## 2021-01-20 PROCEDURE — 81001 URINALYSIS AUTO W/SCOPE: CPT

## 2021-01-21 LAB
-: ABNORMAL
AMORPHOUS: ABNORMAL
BACTERIA: ABNORMAL
BILIRUBIN URINE: NEGATIVE
CASTS UA: ABNORMAL /LPF
COLOR: YELLOW
COMMENT UA: NORMAL
CRYSTALS, UA: ABNORMAL /HPF
EPITHELIAL CELLS UA: ABNORMAL /HPF (ref 0–25)
GLUCOSE URINE: NEGATIVE
KETONES, URINE: NEGATIVE
LEUKOCYTE ESTERASE, URINE: NEGATIVE
MUCUS: ABNORMAL
NITRITE, URINE: NEGATIVE
OTHER OBSERVATIONS UA: ABNORMAL
PH UA: 7.5 (ref 5–9)
PROTEIN UA: NEGATIVE
RBC UA: ABNORMAL /HPF (ref 0–2)
RENAL EPITHELIAL, UA: ABNORMAL /HPF
SPECIFIC GRAVITY UA: 1.01 (ref 1.01–1.02)
TRICHOMONAS: ABNORMAL
TURBIDITY: CLEAR
URINE HGB: NEGATIVE
UROBILINOGEN, URINE: NORMAL
WBC UA: ABNORMAL /HPF (ref 0–5)
YEAST: ABNORMAL

## 2021-01-25 ENCOUNTER — HOSPITAL ENCOUNTER (OUTPATIENT)
Age: 86
Setting detail: SPECIMEN
Discharge: HOME | End: 2021-01-25
Payer: MEDICARE

## 2021-01-25 LAB
ANION GAP SERPL CALCULATED.3IONS-SCNC: 6 MMOL/L (ref 9–17)
BUN BLDV-MCNC: 23 MG/DL (ref 8–23)
BUN/CREAT BLD: 45 (ref 9–20)
CALCIUM SERPL-MCNC: 9.3 MG/DL (ref 8.6–10.4)
CHLORIDE BLD-SCNC: 96 MMOL/L (ref 98–107)
CO2: 31 MMOL/L (ref 20–31)
CREAT SERPL-MCNC: 0.51 MG/DL (ref 0.5–0.9)
GFR AFRICAN AMERICAN: >60 ML/MIN
GFR NON-AFRICAN AMERICAN: >60 ML/MIN
GFR SERPL CREATININE-BSD FRML MDRD: ABNORMAL ML/MIN/{1.73_M2}
GFR SERPL CREATININE-BSD FRML MDRD: ABNORMAL ML/MIN/{1.73_M2}
GLUCOSE BLD-MCNC: 220 MG/DL (ref 70–99)
HCT VFR BLD CALC: 32.7 % (ref 36.3–47.1)
HEMOGLOBIN: 9.8 G/DL (ref 11.9–15.1)
MCH RBC QN AUTO: 28.9 PG (ref 25.2–33.5)
MCHC RBC AUTO-ENTMCNC: 30 G/DL (ref 28.4–34.8)
MCV RBC AUTO: 96.5 FL (ref 82.6–102.9)
NRBC AUTOMATED: 0 PER 100 WBC
PDW BLD-RTO: 14.2 % (ref 11.8–14.4)
PLATELET # BLD: 251 K/UL (ref 138–453)
PMV BLD AUTO: 10.9 FL (ref 8.1–13.5)
POTASSIUM SERPL-SCNC: 4.4 MMOL/L (ref 3.7–5.3)
RBC # BLD: 3.39 M/UL (ref 3.95–5.11)
SODIUM BLD-SCNC: 133 MMOL/L (ref 135–144)
WBC # BLD: 5.6 K/UL (ref 3.5–11.3)

## 2021-01-25 PROCEDURE — 80048 BASIC METABOLIC PNL TOTAL CA: CPT

## 2021-01-25 PROCEDURE — 36415 COLL VENOUS BLD VENIPUNCTURE: CPT

## 2021-01-25 PROCEDURE — 85027 COMPLETE CBC AUTOMATED: CPT

## 2021-01-25 PROCEDURE — P9603 ONE-WAY ALLOW PRORATED MILES: HCPCS

## 2021-02-01 ENCOUNTER — HOSPITAL ENCOUNTER (OUTPATIENT)
Age: 86
Setting detail: SPECIMEN
Discharge: HOME | End: 2021-02-01
Payer: MEDICARE

## 2021-02-01 LAB
ALBUMIN SERPL-MCNC: 3.3 G/DL (ref 3.5–5.2)
ANION GAP SERPL CALCULATED.3IONS-SCNC: 6 MMOL/L (ref 9–17)
BUN BLDV-MCNC: 25 MG/DL (ref 8–23)
BUN/CREAT BLD: 54 (ref 9–20)
CALCIUM SERPL-MCNC: 9.2 MG/DL (ref 8.6–10.4)
CHLORIDE BLD-SCNC: 98 MMOL/L (ref 98–107)
CO2: 31 MMOL/L (ref 20–31)
CREAT SERPL-MCNC: 0.46 MG/DL (ref 0.5–0.9)
GFR AFRICAN AMERICAN: >60 ML/MIN
GFR NON-AFRICAN AMERICAN: >60 ML/MIN
GFR SERPL CREATININE-BSD FRML MDRD: ABNORMAL ML/MIN/{1.73_M2}
GFR SERPL CREATININE-BSD FRML MDRD: ABNORMAL ML/MIN/{1.73_M2}
GLUCOSE BLD-MCNC: 200 MG/DL (ref 70–99)
HCT VFR BLD CALC: 31.6 % (ref 36.3–47.1)
HEMOGLOBIN: 9.6 G/DL (ref 11.9–15.1)
MAGNESIUM: 1.7 MG/DL (ref 1.6–2.6)
MCH RBC QN AUTO: 29.3 PG (ref 25.2–33.5)
MCHC RBC AUTO-ENTMCNC: 30.4 G/DL (ref 28.4–34.8)
MCV RBC AUTO: 96.3 FL (ref 82.6–102.9)
NRBC AUTOMATED: 0 PER 100 WBC
PDW BLD-RTO: 13.9 % (ref 11.8–14.4)
PHOSPHORUS: 4 MG/DL (ref 2.6–4.5)
PLATELET # BLD: 161 K/UL (ref 138–453)
PMV BLD AUTO: 12 FL (ref 8.1–13.5)
POTASSIUM SERPL-SCNC: 4.3 MMOL/L (ref 3.7–5.3)
PTH INTACT: 39.79 PG/ML (ref 15–65)
RBC # BLD: 3.28 M/UL (ref 3.95–5.11)
SODIUM BLD-SCNC: 135 MMOL/L (ref 135–144)
URIC ACID: 2 MG/DL (ref 2.4–5.7)
WBC # BLD: 4.5 K/UL (ref 3.5–11.3)

## 2021-02-01 PROCEDURE — 36415 COLL VENOUS BLD VENIPUNCTURE: CPT

## 2021-02-01 PROCEDURE — 85027 COMPLETE CBC AUTOMATED: CPT

## 2021-02-01 PROCEDURE — 80069 RENAL FUNCTION PANEL: CPT

## 2021-02-01 PROCEDURE — 84550 ASSAY OF BLOOD/URIC ACID: CPT

## 2021-02-01 PROCEDURE — 83970 ASSAY OF PARATHORMONE: CPT

## 2021-02-01 PROCEDURE — P9604 ONE-WAY ALLOW PRORATED TRIP: HCPCS

## 2021-02-01 PROCEDURE — 83735 ASSAY OF MAGNESIUM: CPT

## 2021-02-02 ENCOUNTER — HOSPITAL ENCOUNTER (OUTPATIENT)
Age: 86
Setting detail: SPECIMEN
Discharge: HOME | End: 2021-02-02
Payer: MEDICARE

## 2021-02-02 PROCEDURE — 84156 ASSAY OF PROTEIN URINE: CPT

## 2021-02-02 PROCEDURE — 81001 URINALYSIS AUTO W/SCOPE: CPT

## 2021-02-02 PROCEDURE — 82570 ASSAY OF URINE CREATININE: CPT

## 2021-02-03 LAB
-: ABNORMAL
AMORPHOUS: ABNORMAL
BACTERIA: ABNORMAL
BILIRUBIN URINE: NEGATIVE
CASTS UA: ABNORMAL /LPF
COLOR: YELLOW
COMMENT UA: ABNORMAL
CREATININE URINE: 69.7 MG/DL (ref 28–217)
CRYSTALS, UA: ABNORMAL /HPF
EPITHELIAL CELLS UA: ABNORMAL /HPF (ref 0–25)
GLUCOSE URINE: NEGATIVE
KETONES, URINE: NEGATIVE
LEUKOCYTE ESTERASE, URINE: NEGATIVE
MUCUS: ABNORMAL
NITRITE, URINE: NEGATIVE
OTHER OBSERVATIONS UA: ABNORMAL
PH UA: 5.5 (ref 5–9)
PROTEIN UA: NEGATIVE
RBC UA: ABNORMAL /HPF (ref 0–2)
RENAL EPITHELIAL, UA: ABNORMAL /HPF
SPECIFIC GRAVITY UA: 1.02 (ref 1.01–1.02)
TOTAL PROTEIN, URINE: 10 MG/DL
TRICHOMONAS: ABNORMAL
TURBIDITY: CLEAR
URINE HGB: NEGATIVE
UROBILINOGEN, URINE: NORMAL
WBC UA: ABNORMAL /HPF (ref 0–5)
YEAST: ABNORMAL

## 2021-02-08 ENCOUNTER — HOSPITAL ENCOUNTER (OUTPATIENT)
Age: 86
Setting detail: SPECIMEN
Discharge: HOME | End: 2021-02-08
Payer: MEDICARE

## 2021-02-08 LAB
ANION GAP SERPL CALCULATED.3IONS-SCNC: 9 MMOL/L (ref 9–17)
BUN BLDV-MCNC: 23 MG/DL (ref 8–23)
BUN/CREAT BLD: 16 (ref 9–20)
CALCIUM SERPL-MCNC: 9.2 MG/DL (ref 8.6–10.4)
CHLORIDE BLD-SCNC: 104 MMOL/L (ref 98–107)
CO2: 26 MMOL/L (ref 20–31)
CREAT SERPL-MCNC: 1.44 MG/DL (ref 0.5–0.9)
GFR AFRICAN AMERICAN: 42 ML/MIN
GFR NON-AFRICAN AMERICAN: 35 ML/MIN
GFR SERPL CREATININE-BSD FRML MDRD: ABNORMAL ML/MIN/{1.73_M2}
GFR SERPL CREATININE-BSD FRML MDRD: ABNORMAL ML/MIN/{1.73_M2}
GLUCOSE BLD-MCNC: 88 MG/DL (ref 70–99)
HCT VFR BLD CALC: 35.6 % (ref 36.3–47.1)
HEMOGLOBIN: 11.1 G/DL (ref 11.9–15.1)
MCH RBC QN AUTO: 31 PG (ref 25.2–33.5)
MCHC RBC AUTO-ENTMCNC: 31.2 G/DL (ref 28.4–34.8)
MCV RBC AUTO: 99.4 FL (ref 82.6–102.9)
NRBC AUTOMATED: 0 PER 100 WBC
PDW BLD-RTO: 13.7 % (ref 11.8–14.4)
PLATELET # BLD: 171 K/UL (ref 138–453)
PMV BLD AUTO: 10 FL (ref 8.1–13.5)
POTASSIUM SERPL-SCNC: 4.3 MMOL/L (ref 3.7–5.3)
RBC # BLD: 3.58 M/UL (ref 3.95–5.11)
SODIUM BLD-SCNC: 139 MMOL/L (ref 135–144)
WBC # BLD: 6.9 K/UL (ref 3.5–11.3)

## 2021-02-08 PROCEDURE — P9604 ONE-WAY ALLOW PRORATED TRIP: HCPCS

## 2021-02-08 PROCEDURE — 80048 BASIC METABOLIC PNL TOTAL CA: CPT

## 2021-02-08 PROCEDURE — 85027 COMPLETE CBC AUTOMATED: CPT

## 2021-02-08 PROCEDURE — 36415 COLL VENOUS BLD VENIPUNCTURE: CPT

## 2023-02-23 ENCOUNTER — HOSPITAL ENCOUNTER (OUTPATIENT)
Age: 88
Setting detail: SPECIMEN
Discharge: HOME | End: 2023-02-23

## 2023-02-27 ENCOUNTER — HOSPITAL ENCOUNTER (OUTPATIENT)
Age: 88
Setting detail: SPECIMEN
Discharge: HOME | End: 2023-02-27

## 2023-02-27 LAB
ABSOLUTE EOS #: 0.06 K/UL (ref 0–0.44)
ABSOLUTE IMMATURE GRANULOCYTE: 0.12 K/UL (ref 0–0.3)
ABSOLUTE LYMPH #: 1.27 K/UL (ref 1.1–3.7)
ABSOLUTE MONO #: 0.74 K/UL (ref 0.1–1.2)
ALBUMIN SERPL-MCNC: 3 G/DL (ref 3.5–5.2)
ALBUMIN/GLOBULIN RATIO: 1.5 (ref 1–2.5)
ALP SERPL-CCNC: 53 U/L (ref 35–104)
ALT SERPL-CCNC: 14 U/L (ref 5–33)
ANION GAP SERPL CALCULATED.3IONS-SCNC: 9 MMOL/L (ref 9–17)
AST SERPL-CCNC: 21 U/L
BASOPHILS # BLD: 0 % (ref 0–2)
BASOPHILS ABSOLUTE: <0.03 K/UL (ref 0–0.2)
BILIRUB SERPL-MCNC: 0.7 MG/DL (ref 0.3–1.2)
BUN SERPL-MCNC: 21 MG/DL (ref 8–23)
BUN/CREAT BLD: 41 (ref 9–20)
CALCIUM SERPL-MCNC: 8.8 MG/DL (ref 8.6–10.4)
CHLORIDE SERPL-SCNC: 96 MMOL/L (ref 98–107)
CO2 SERPL-SCNC: 32 MMOL/L (ref 20–31)
CREAT SERPL-MCNC: 0.51 MG/DL (ref 0.5–0.9)
EOSINOPHILS RELATIVE PERCENT: 1 % (ref 1–4)
GFR SERPL CREATININE-BSD FRML MDRD: >60 ML/MIN/1.73M2
GLUCOSE SERPL-MCNC: 168 MG/DL (ref 70–99)
HCT VFR BLD AUTO: 29.1 % (ref 36.3–47.1)
HGB BLD-MCNC: 9.4 G/DL (ref 11.9–15.1)
IMMATURE GRANULOCYTES: 1 %
LYMPHOCYTES # BLD: 14 % (ref 24–43)
MCH RBC QN AUTO: 29.7 PG (ref 25.2–33.5)
MCHC RBC AUTO-ENTMCNC: 32.3 G/DL (ref 28.4–34.8)
MCV RBC AUTO: 91.8 FL (ref 82.6–102.9)
MONOCYTES # BLD: 8 % (ref 3–12)
NRBC AUTOMATED: 0 PER 100 WBC
PDW BLD-RTO: 14.5 % (ref 11.8–14.4)
PLATELET # BLD AUTO: 296 K/UL (ref 138–453)
PMV BLD AUTO: 10.4 FL (ref 8.1–13.5)
POTASSIUM SERPL-SCNC: 3 MMOL/L (ref 3.7–5.3)
PROT SERPL-MCNC: 5 G/DL (ref 6.4–8.3)
RBC # BLD: 3.17 M/UL (ref 3.95–5.11)
SEG NEUTROPHILS: 76 % (ref 36–65)
SEGMENTED NEUTROPHILS ABSOLUTE COUNT: 6.64 K/UL (ref 1.5–8.1)
SODIUM SERPL-SCNC: 137 MMOL/L (ref 135–144)
WBC # BLD AUTO: 8.9 K/UL (ref 3.5–11.3)

## 2023-02-27 PROCEDURE — P9604 ONE-WAY ALLOW PRORATED TRIP: HCPCS

## 2023-02-27 PROCEDURE — 85025 COMPLETE CBC W/AUTO DIFF WBC: CPT

## 2023-02-27 PROCEDURE — 80053 COMPREHEN METABOLIC PANEL: CPT

## 2023-02-27 PROCEDURE — 36415 COLL VENOUS BLD VENIPUNCTURE: CPT

## 2023-03-03 ENCOUNTER — HOSPITAL ENCOUNTER (INPATIENT)
Age: 88
LOS: 3 days | Discharge: NURSING, REHAB OR LONG-TERM CARE | DRG: 641 | End: 2023-03-06
Attending: STUDENT IN AN ORGANIZED HEALTH CARE EDUCATION/TRAINING PROGRAM | Admitting: STUDENT IN AN ORGANIZED HEALTH CARE EDUCATION/TRAINING PROGRAM
Payer: MEDICARE

## 2023-03-03 ENCOUNTER — APPOINTMENT (OUTPATIENT)
Dept: CT IMAGING | Age: 88
DRG: 641 | End: 2023-03-03
Payer: MEDICARE

## 2023-03-03 ENCOUNTER — HOSPITAL ENCOUNTER (OUTPATIENT)
Age: 88
Setting detail: SPECIMEN
Discharge: HOME | End: 2023-03-03

## 2023-03-03 DIAGNOSIS — R53.1 GENERAL WEAKNESS: ICD-10-CM

## 2023-03-03 DIAGNOSIS — E87.1 HYPONATREMIA: ICD-10-CM

## 2023-03-03 DIAGNOSIS — N30.00 ACUTE CYSTITIS WITHOUT HEMATURIA: ICD-10-CM

## 2023-03-03 DIAGNOSIS — R77.8 ELEVATED TROPONIN: Primary | ICD-10-CM

## 2023-03-03 LAB
ABSOLUTE EOS #: 0.03 K/UL (ref 0–0.44)
ABSOLUTE IMMATURE GRANULOCYTE: 0.08 K/UL (ref 0–0.3)
ABSOLUTE LYMPH #: 0.74 K/UL (ref 1.1–3.7)
ABSOLUTE MONO #: 1.01 K/UL (ref 0.1–1.2)
ALBUMIN SERPL-MCNC: 3.1 G/DL (ref 3.5–5.2)
ALBUMIN/GLOBULIN RATIO: 1.2 (ref 1–2.5)
ALP SERPL-CCNC: 82 U/L (ref 35–104)
ALT SERPL-CCNC: 17 U/L (ref 5–33)
ANION GAP SERPL CALCULATED.3IONS-SCNC: 7 MMOL/L (ref 9–17)
ANION GAP SERPL CALCULATED.3IONS-SCNC: 8 MMOL/L (ref 9–17)
AST SERPL-CCNC: 15 U/L
BASOPHILS # BLD: 0 % (ref 0–2)
BASOPHILS ABSOLUTE: <0.03 K/UL (ref 0–0.2)
BILIRUB SERPL-MCNC: 0.8 MG/DL (ref 0.3–1.2)
BUN SERPL-MCNC: 17 MG/DL (ref 8–23)
BUN SERPL-MCNC: 18 MG/DL (ref 8–23)
BUN/CREAT BLD: 29 (ref 9–20)
BUN/CREAT BLD: 37 (ref 9–20)
CALCIUM SERPL-MCNC: 8.1 MG/DL (ref 8.6–10.4)
CALCIUM SERPL-MCNC: 8.6 MG/DL (ref 8.6–10.4)
CHLORIDE SERPL-SCNC: 87 MMOL/L (ref 98–107)
CHLORIDE SERPL-SCNC: 91 MMOL/L (ref 98–107)
CO2 SERPL-SCNC: 31 MMOL/L (ref 20–31)
CO2 SERPL-SCNC: 33 MMOL/L (ref 20–31)
CREAT SERPL-MCNC: 0.49 MG/DL (ref 0.5–0.9)
CREAT SERPL-MCNC: 0.59 MG/DL (ref 0.5–0.9)
EOSINOPHILS RELATIVE PERCENT: 0 % (ref 1–4)
GFR SERPL CREATININE-BSD FRML MDRD: >60 ML/MIN/1.73M2
GFR SERPL CREATININE-BSD FRML MDRD: >60 ML/MIN/1.73M2
GLUCOSE SERPL-MCNC: 169 MG/DL (ref 70–99)
GLUCOSE SERPL-MCNC: 182 MG/DL (ref 70–99)
HCT VFR BLD AUTO: 29.6 % (ref 36.3–47.1)
HGB BLD-MCNC: 9.9 G/DL (ref 11.9–15.1)
IMMATURE GRANULOCYTES: 1 %
LYMPHOCYTES # BLD: 7 % (ref 24–43)
MCH RBC QN AUTO: 31.2 PG (ref 25.2–33.5)
MCHC RBC AUTO-ENTMCNC: 33.4 G/DL (ref 28.4–34.8)
MCV RBC AUTO: 93.4 FL (ref 82.6–102.9)
MONOCYTES # BLD: 10 % (ref 3–12)
NRBC AUTOMATED: 0 PER 100 WBC
PDW BLD-RTO: 14.6 % (ref 11.8–14.4)
PLATELET # BLD AUTO: 294 K/UL (ref 138–453)
PMV BLD AUTO: 10.6 FL (ref 8.1–13.5)
POTASSIUM SERPL-SCNC: 3.8 MMOL/L (ref 3.7–5.3)
POTASSIUM SERPL-SCNC: 3.8 MMOL/L (ref 3.7–5.3)
PROT SERPL-MCNC: 5.6 G/DL (ref 6.4–8.3)
RBC # BLD: 3.17 M/UL (ref 3.95–5.11)
SEG NEUTROPHILS: 82 % (ref 36–65)
SEGMENTED NEUTROPHILS ABSOLUTE COUNT: 8.75 K/UL (ref 1.5–8.1)
SODIUM SERPL-SCNC: 127 MMOL/L (ref 135–144)
SODIUM SERPL-SCNC: 130 MMOL/L (ref 135–144)
TROPONIN I SERPL DL<=0.01 NG/ML-MCNC: 48 NG/L (ref 0–14)
WBC # BLD AUTO: 10.6 K/UL (ref 3.5–11.3)

## 2023-03-03 PROCEDURE — 74177 CT ABD & PELVIS W/CONTRAST: CPT

## 2023-03-03 PROCEDURE — 6360000002 HC RX W HCPCS: Performed by: STUDENT IN AN ORGANIZED HEALTH CARE EDUCATION/TRAINING PROGRAM

## 2023-03-03 PROCEDURE — 94761 N-INVAS EAR/PLS OXIMETRY MLT: CPT

## 2023-03-03 PROCEDURE — 2580000003 HC RX 258: Performed by: STUDENT IN AN ORGANIZED HEALTH CARE EDUCATION/TRAINING PROGRAM

## 2023-03-03 PROCEDURE — 36415 COLL VENOUS BLD VENIPUNCTURE: CPT

## 2023-03-03 PROCEDURE — 6370000000 HC RX 637 (ALT 250 FOR IP): Performed by: STUDENT IN AN ORGANIZED HEALTH CARE EDUCATION/TRAINING PROGRAM

## 2023-03-03 PROCEDURE — 83935 ASSAY OF URINE OSMOLALITY: CPT

## 2023-03-03 PROCEDURE — 80053 COMPREHEN METABOLIC PANEL: CPT

## 2023-03-03 PROCEDURE — 84300 ASSAY OF URINE SODIUM: CPT

## 2023-03-03 PROCEDURE — 80048 BASIC METABOLIC PNL TOTAL CA: CPT

## 2023-03-03 PROCEDURE — 81001 URINALYSIS AUTO W/SCOPE: CPT

## 2023-03-03 PROCEDURE — 83930 ASSAY OF BLOOD OSMOLALITY: CPT

## 2023-03-03 PROCEDURE — 93005 ELECTROCARDIOGRAM TRACING: CPT | Performed by: STUDENT IN AN ORGANIZED HEALTH CARE EDUCATION/TRAINING PROGRAM

## 2023-03-03 PROCEDURE — 85025 COMPLETE CBC W/AUTO DIFF WBC: CPT

## 2023-03-03 PROCEDURE — 6360000004 HC RX CONTRAST MEDICATION: Performed by: STUDENT IN AN ORGANIZED HEALTH CARE EDUCATION/TRAINING PROGRAM

## 2023-03-03 PROCEDURE — 84484 ASSAY OF TROPONIN QUANT: CPT

## 2023-03-03 PROCEDURE — 99285 EMERGENCY DEPT VISIT HI MDM: CPT

## 2023-03-03 PROCEDURE — P9603 ONE-WAY ALLOW PRORATED MILES: HCPCS

## 2023-03-03 PROCEDURE — 1200000000 HC SEMI PRIVATE

## 2023-03-03 RX ORDER — CALCIUM CARBONATE 750 MG/1
1 TABLET, CHEWABLE ORAL EVERY 6 HOURS PRN
COMMUNITY

## 2023-03-03 RX ORDER — ACETAMINOPHEN 650 MG/1
650 SUPPOSITORY RECTAL EVERY 6 HOURS PRN
Status: DISCONTINUED | OUTPATIENT
Start: 2023-03-03 | End: 2023-03-06 | Stop reason: HOSPADM

## 2023-03-03 RX ORDER — FAMOTIDINE 20 MG/1
20 TABLET, FILM COATED ORAL 2 TIMES DAILY
COMMUNITY

## 2023-03-03 RX ORDER — SODIUM CHLORIDE 9 MG/ML
INJECTION, SOLUTION INTRAVENOUS PRN
Status: DISCONTINUED | OUTPATIENT
Start: 2023-03-03 | End: 2023-03-06 | Stop reason: HOSPADM

## 2023-03-03 RX ORDER — SODIUM CHLORIDE 9 MG/ML
INJECTION, SOLUTION INTRAVENOUS CONTINUOUS
Status: DISCONTINUED | OUTPATIENT
Start: 2023-03-03 | End: 2023-03-06

## 2023-03-03 RX ORDER — SODIUM CHLORIDE 0.9 % (FLUSH) 0.9 %
10 SYRINGE (ML) INJECTION EVERY 12 HOURS SCHEDULED
Status: DISCONTINUED | OUTPATIENT
Start: 2023-03-03 | End: 2023-03-06 | Stop reason: HOSPADM

## 2023-03-03 RX ORDER — MAGNESIUM SULFATE IN WATER 40 MG/ML
2000 INJECTION, SOLUTION INTRAVENOUS PRN
Status: DISCONTINUED | OUTPATIENT
Start: 2023-03-03 | End: 2023-03-06 | Stop reason: HOSPADM

## 2023-03-03 RX ORDER — ATORVASTATIN CALCIUM 10 MG/1
10 TABLET, FILM COATED ORAL DAILY
Status: DISCONTINUED | OUTPATIENT
Start: 2023-03-03 | End: 2023-03-06 | Stop reason: HOSPADM

## 2023-03-03 RX ORDER — LISINOPRIL 10 MG/1
10 TABLET ORAL DAILY
Status: DISCONTINUED | OUTPATIENT
Start: 2023-03-03 | End: 2023-03-06 | Stop reason: HOSPADM

## 2023-03-03 RX ORDER — BISACODYL 10 MG
10 SUPPOSITORY, RECTAL RECTAL DAILY PRN
COMMUNITY

## 2023-03-03 RX ORDER — POTASSIUM CHLORIDE 750 MG/1
10 CAPSULE, EXTENDED RELEASE ORAL DAILY
COMMUNITY

## 2023-03-03 RX ORDER — SODIUM PHOSPHATE, DIBASIC AND SODIUM PHOSPHATE, MONOBASIC 7; 19 G/133ML; G/133ML
1 ENEMA RECTAL
COMMUNITY

## 2023-03-03 RX ORDER — DICYCLOMINE HYDROCHLORIDE 10 MG/1
10 CAPSULE ORAL
COMMUNITY

## 2023-03-03 RX ORDER — ONDANSETRON 2 MG/ML
4 INJECTION INTRAMUSCULAR; INTRAVENOUS EVERY 6 HOURS PRN
Status: DISCONTINUED | OUTPATIENT
Start: 2023-03-03 | End: 2023-03-06 | Stop reason: HOSPADM

## 2023-03-03 RX ORDER — ONDANSETRON HYDROCHLORIDE 8 MG/1
8 TABLET, FILM COATED ORAL EVERY 8 HOURS PRN
COMMUNITY

## 2023-03-03 RX ORDER — ENOXAPARIN SODIUM 100 MG/ML
INJECTION SUBCUTANEOUS DAILY
COMMUNITY

## 2023-03-03 RX ORDER — ENOXAPARIN SODIUM 100 MG/ML
30 INJECTION SUBCUTANEOUS DAILY
Status: DISCONTINUED | OUTPATIENT
Start: 2023-03-03 | End: 2023-03-06 | Stop reason: HOSPADM

## 2023-03-03 RX ORDER — PANTOPRAZOLE SODIUM 40 MG/1
40 TABLET, DELAYED RELEASE ORAL
Status: DISCONTINUED | OUTPATIENT
Start: 2023-03-04 | End: 2023-03-06 | Stop reason: HOSPADM

## 2023-03-03 RX ORDER — ACETAMINOPHEN 325 MG/1
650 TABLET ORAL EVERY 6 HOURS PRN
Status: DISCONTINUED | OUTPATIENT
Start: 2023-03-03 | End: 2023-03-06 | Stop reason: HOSPADM

## 2023-03-03 RX ORDER — NITROGLYCERIN 0.4 MG/1
0.4 TABLET SUBLINGUAL EVERY 5 MIN PRN
COMMUNITY

## 2023-03-03 RX ORDER — AMLODIPINE BESYLATE 5 MG/1
5 TABLET ORAL DAILY
Status: DISCONTINUED | OUTPATIENT
Start: 2023-03-03 | End: 2023-03-06 | Stop reason: HOSPADM

## 2023-03-03 RX ORDER — ATORVASTATIN CALCIUM 10 MG/1
10 TABLET, FILM COATED ORAL NIGHTLY
COMMUNITY

## 2023-03-03 RX ORDER — POTASSIUM CHLORIDE 20 MEQ/1
40 TABLET, EXTENDED RELEASE ORAL PRN
Status: DISCONTINUED | OUTPATIENT
Start: 2023-03-03 | End: 2023-03-06 | Stop reason: HOSPADM

## 2023-03-03 RX ORDER — LOPERAMIDE HYDROCHLORIDE 2 MG/1
2 CAPSULE ORAL 4 TIMES DAILY PRN
COMMUNITY

## 2023-03-03 RX ORDER — ONDANSETRON 4 MG/1
4 TABLET, ORALLY DISINTEGRATING ORAL EVERY 8 HOURS PRN
Status: DISCONTINUED | OUTPATIENT
Start: 2023-03-03 | End: 2023-03-06 | Stop reason: HOSPADM

## 2023-03-03 RX ORDER — POTASSIUM CHLORIDE 7.45 MG/ML
10 INJECTION INTRAVENOUS PRN
Status: DISCONTINUED | OUTPATIENT
Start: 2023-03-03 | End: 2023-03-06 | Stop reason: HOSPADM

## 2023-03-03 RX ORDER — AMLODIPINE BESYLATE AND BENAZEPRIL HYDROCHLORIDE 5; 10 MG/1; MG/1
1 CAPSULE ORAL DAILY
Status: DISCONTINUED | OUTPATIENT
Start: 2023-03-03 | End: 2023-03-03

## 2023-03-03 RX ORDER — VITAMIN B COMPLEX
2000 TABLET ORAL DAILY
Status: DISCONTINUED | OUTPATIENT
Start: 2023-03-03 | End: 2023-03-06 | Stop reason: HOSPADM

## 2023-03-03 RX ORDER — 0.9 % SODIUM CHLORIDE 0.9 %
500 INTRAVENOUS SOLUTION INTRAVENOUS ONCE
Status: COMPLETED | OUTPATIENT
Start: 2023-03-03 | End: 2023-03-03

## 2023-03-03 RX ORDER — MAGNESIUM HYDROXIDE/ALUMINUM HYDROXICE/SIMETHICONE 120; 1200; 1200 MG/30ML; MG/30ML; MG/30ML
5 SUSPENSION ORAL EVERY 6 HOURS PRN
COMMUNITY

## 2023-03-03 RX ORDER — ACETAMINOPHEN 650 MG/1
650 SUPPOSITORY RECTAL EVERY 4 HOURS PRN
COMMUNITY

## 2023-03-03 RX ORDER — POLYETHYLENE GLYCOL 3350 17 G/17G
17 POWDER, FOR SOLUTION ORAL DAILY PRN
Status: DISCONTINUED | OUTPATIENT
Start: 2023-03-03 | End: 2023-03-06 | Stop reason: HOSPADM

## 2023-03-03 RX ORDER — SODIUM CHLORIDE 0.9 % (FLUSH) 0.9 %
10 SYRINGE (ML) INJECTION PRN
Status: DISCONTINUED | OUTPATIENT
Start: 2023-03-03 | End: 2023-03-06 | Stop reason: HOSPADM

## 2023-03-03 RX ORDER — METOPROLOL TARTRATE 50 MG/1
50 TABLET, FILM COATED ORAL 2 TIMES DAILY
Status: DISCONTINUED | OUTPATIENT
Start: 2023-03-03 | End: 2023-03-06 | Stop reason: HOSPADM

## 2023-03-03 RX ORDER — ACETAMINOPHEN 325 MG/1
650 TABLET ORAL EVERY 4 HOURS PRN
COMMUNITY

## 2023-03-03 RX ADMIN — SODIUM CHLORIDE, PRESERVATIVE FREE 10 ML: 5 INJECTION INTRAVENOUS at 19:42

## 2023-03-03 RX ADMIN — SODIUM CHLORIDE 500 ML: 9 INJECTION, SOLUTION INTRAVENOUS at 15:26

## 2023-03-03 RX ADMIN — SODIUM CHLORIDE: 9 INJECTION, SOLUTION INTRAVENOUS at 19:07

## 2023-03-03 RX ADMIN — Medication 2000 UNITS: at 19:43

## 2023-03-03 RX ADMIN — ATORVASTATIN CALCIUM 10 MG: 10 TABLET, FILM COATED ORAL at 19:43

## 2023-03-03 RX ADMIN — ENOXAPARIN SODIUM 30 MG: 100 INJECTION SUBCUTANEOUS at 19:43

## 2023-03-03 RX ADMIN — IOPAMIDOL 75 ML: 755 INJECTION, SOLUTION INTRAVENOUS at 16:31

## 2023-03-03 ASSESSMENT — PAIN SCALES - GENERAL: PAINLEVEL_OUTOF10: 0

## 2023-03-03 NOTE — ED PROVIDER NOTES
[facility]      EMERGENCY MEDICINE     Pt Name: Sadiq Araujo  MRN: [id number]  [place] 1935  Date of evaluation: 3/3/2023  Provider: Rai Joel MD    CHIEF COMPLAINT       Chief Complaint   Patient presents with    Abnormal Lab     Patient presents to the emergency department from [address] [place] [address] for low sodium levels. Patient is in [address] [place] [address] for rehab for a right femur fracture. She reports that she has not felt well the past couple days, has felt weak and nauseous. This am had blood work completed with results of sodium level of 127. Also complaint of heartburn after eating lunch today      HISTORY OF PRESENT ILLNESS   Sadiq Araujo is a 81 y.o. female who presents to the emergency department for generalized fatigue and weakness over the last couple days was sent here from rehabilitation that she is currently going this for recent femur fracture. For hyponatremia with a sodium of 127. She denies any chest pain shortness of breath. Does mention some intermittent abdominal pain decreased appetite and some nausea. PASTMEDICAL HISTORY     Past Medical History:   Diagnosis Date    Arthritis     Diabetes mellitus ([address]     Dysphagia     GERD (gastroesophageal reflux disease)     HTN (hypertension)     Hyperlipidemia     Hypertension     Type 2 diabetes mellitus ([address]        Patient Active Problem List   Diagnosis Code    Diarrhea R19.7    Dysphagia R13.10    Change in bowel habit R19.4    Diverticulosis K57.90    Hemorrhoids K64.9    Weight loss R63.4    Nausea R11.0    Pain of upper abdomen R10.10    Hiatal hernia K44.9    COVID-19 U07.1    Severe protein-calorie malnutrition ([address] E43    Hyponatremia E87.1     SURGICAL HISTORY       Past Surgical History:   Procedure Laterality Date    CARDIAC CATHETERIZATION  2007?     CHOLECYSTECTOMY, LAPAROSCOPIC      COLONOSCOPY  2008    COLONOSCOPY  1/26/2016    -hemorrhoids    COLONOSCOPY  11/20/2019    Dr. Samara Modi bx(normal)diverticulosis,hemorrhoids    COLONOSCOPY N/A 2019    COLONOSCOPY POLYPECTOMY SNARE/COLD BIOPSY performed by Raymundo Burdick MD at [address] (CERVIX NOT REMOVED)      ROTATOR CUFF REPAIR Right     TOTAL KNEE ARTHROPLASTY Left     UPPER GASTROINTESTINAL ENDOSCOPY  2016    -bx,dilation    UPPER GASTROINTESTINAL ENDOSCOPY  2020    -bx(mild chronic gastritis,neg H-Pylori)hiatal hernia    UPPER GASTROINTESTINAL ENDOSCOPY N/A 2020    EGD BIOPSY performed by Raymundo Burdick MD at [facility]       Previous Medications    AMLODIPINE-BENAZEPRIL (LOTREL) 5-10 MG PER CAPSULE    Take 1 capsule by mouth daily    BLOOD GLUCOSE MONITOR STRIPS    Test TID  DX-E11.9 Insulin dependent    DIPHENOXYLATE-ATROPINE (LOMOTIL) 2.5-0.025 MG PER TABLET    Take 1 tablet by mouth 3 times daily as needed for Diarrhea    GLUCOSE MONITORING KIT (FREESTYLE) MONITORING KIT    1 kit by Does not apply route daily DX-E11.9 insulin dependent    INSULIN PEN NEEDLE (PEN NEEDLES 31GX5/16\") 31G X 8 MM MISC    1 each by Does not apply route daily    LANCETS MISC    Use tid Dx-- E11.9 insulin dependent    METFORMIN (GLUCOPHAGE) 1000 MG TABLET    Take 1,000 mg by mouth daily (with breakfast)    METOPROLOL (LOPRESSOR) 50 MG TABLET    Take 50 mg by mouth 2 times daily    OMEPRAZOLE (PRILOSEC PO)    Take 20 mg by mouth daily     SIMVASTATIN (ZOCOR) 20 MG TABLET    Take 20 mg by mouth nightly    SITAGLIPTIN (JANUVIA) 100 MG TABLET    Take 100 mg by mouth daily    VITAMIN D (CHOLECALCIFEROL) 50 MCG (2000) TABS TABLET    Take 1 tablet by mouth daily       ALLERGIES     has No Known Allergies. FAMILY HISTORY     She indicated that her mother is . She indicated that her father is . She indicated that the status of her brother is unknown.        SOCIAL HISTORY       Social History     Tobacco Use    Smoking status: Former     Years: 10.00     Types: Cigarettes     Quit date: 1968     Years since quittin.5    Smokeless tobacco: Never   Vaping Use    Vaping Use: Never used   Substance Use Topics    Alcohol use: Yes     Alcohol/week: 1.0 standard drink     Types: 1 Glasses of wine per week     Comment: rarely    Drug use: No       PHYSICAL EXAM       ED Triage Vitals [23 1455]   BP Temp Temp Source Heart Rate Resp SpO2 Height Weight   (!) 88/40 99.4 °F (37.4 °C) Oral 79 20 93 % 4' 11.5\" (1.511 m) 99 lb 14.4 oz (45.3 kg)       Physical Exam  Vitals and nursing note reviewed. Constitutional:       General: She is not in acute distress. Appearance: She is not toxic-appearing or diaphoretic. HENT:      Head: Normocephalic and atraumatic. Right Ear: External ear normal.      Left Ear: External ear normal.      Nose: Nose normal.      Mouth/Throat:      Mouth: Mucous membranes are moist.      Pharynx: Oropharynx is clear. Eyes:      General: No scleral icterus. Conjunctiva/sclera: Conjunctivae normal.   Cardiovascular:      Rate and Rhythm: Normal rate and regular rhythm. Pulses: Normal pulses. Heart sounds: Normal heart sounds. Pulmonary:      Effort: Pulmonary effort is normal. No respiratory distress. Breath sounds: Normal breath sounds. Abdominal:      General: Abdomen is flat. There is no distension. Palpations: Abdomen is soft. Tenderness: There is no abdominal tenderness. There is no guarding or rebound. Musculoskeletal:         General: Normal range of motion. Cervical back: Normal range of motion and neck supple. No rigidity. No muscular tenderness. Lymphadenopathy:      Cervical: No cervical adenopathy. Skin:     General: Skin is warm and dry. Capillary Refill: Capillary refill takes less than 2 seconds. Coloration: Skin is not jaundiced. Neurological:      General: No focal deficit present. Mental Status: She is alert and oriented to person, place, and time. Psychiatric:         Mood and Affect: Mood normal.         Behavior: Behavior normal.       FORMAL DIAGNOSTIC RESULTS     RADIOLOGY: Interpretation per the Radiologist below, if available at the time of this note (none if blank):    CT ABDOMEN PELVIS W IV CONTRAST Additional Contrast? None   Final Result   1. No acute findings in the abdomen or pelvis. 2. Decreased size of a now small hiatal hernia. There is fluid in the   esophagus which could reflect esophageal dysmotility or gastroesophageal   reflux. 3. Stable small low-attenuation liver lesions favored to represent cysts. LABS: (none if blank)  Labs Reviewed   CBC WITH AUTO DIFFERENTIAL - Abnormal; Notable for the following components:       Result Value    RBC 3.17 (*)     Hemoglobin 9.9 (*)     Hematocrit 29.6 (*)     RDW 14.6 (*)     Seg Neutrophils 82 (*)     Lymphocytes 7 (*)     Eosinophils % 0 (*)     Immature Granulocytes 1 (*)     Segs Absolute 8.75 (*)     Absolute Lymph # 0.74 (*)     All other components within normal limits   COMPREHENSIVE METABOLIC PANEL - Abnormal; Notable for the following components:    Glucose 182 (*)     Bun/Cre Ratio 29 (*)     Calcium 8.1 (*)     Sodium 130 (*)     Chloride 91 (*)     Anion Gap 8 (*)     Total Protein 5.6 (*)     Albumin 3.1 (*)     All other components within normal limits   TROPONIN - Abnormal; Notable for the following components:    Troponin, High Sensitivity 48 (*)     All other components within normal limits       (Any cultures that may have been sent were not resulted at the time of this patient visit)    [facility] / ED COURSE:     External Documentation Reviewed:         Previous patient encounter documents & history available on EMR was reviewed: Patient was seen on 2/15/2023 for closed displaced psychiatric fracture of the right femur.       1)           Differential Diagnosis includes (but not limited to):  Ileus, SBO, metabolic derangement, electrolyte derangement        Diagnoses Considered but I have low suspicion of:          Decision Rules/Clinical Scores utilized:               See Formal Diagnostic Results above for the lab and radiology tests and orders. 3)  Treatment and Disposition         ED Reassessment:  See ED course         Case discussed with consulting clinician: Dr. Jesus Lu         Shared Decision-Making was performed and disposition discussed with the        Patient/Family and questions answered          Social determinants of health impacting treatment or disposition:  none      Summary of Patient Presentation:      ED Course as of 03/03/23 1747   Fri Mar 03, 2023   1646 Troponin, High Sensitivity(!): 48 [AL]   1646 Sodium(!): 130 [AL]   1646 Chloride(!): 91 [AL]   1646 Anion Gap(!): 8 [AL]   1646 Hemoglobin Quant(!): 9.9  Comparable with hemoglobin 4 days ago [AL]   1646 Troponin, High Sensitivity(!): 48  Elevated from baseline no active chest pain at this time [AL]   1646 EKG 12 Lead  Sinus rhythm ventricular rate 76, parable 154, QRS 86, QTc 474 T wave inversion in V3. [AL]   1651 To the hospitalist about this patient and the need for admission he agrees with the plan. Asked to return to cardiology, cardiology was paged. [AL]   [id number] Cardiology was contacted and agrees with the plan at this time. [AL]   1743 CT ABDOMEN PELVIS W IV CONTRAST Additional Contrast? None  \"IMPRESSION:  1. No acute findings in the abdomen or pelvis. 2. Decreased size of a now small hiatal hernia. There is fluid in the  esophagus which could reflect esophageal dysmotility or gastroesophageal  reflux. 3. Stable small low-attenuation liver lesions favored to represent cysts. \" [AL]      ED Course User Index  [AL] Agata Elizondo MD         Medical Decision Making  Amount and/or Complexity of Data Reviewed  Labs: ordered. Decision-making details documented in ED Course. Radiology: ordered. Decision-making details documented in ED Course.   ECG/medicine tests: ordered. Decision-making details documented in ED Course. Risk  Prescription drug management. Decision regarding hospitalization. Is a 81-year-old female recently undergone a subcontractor femur fracture repair a few weeks ago came in for hyponatremia concerns on recent laboratory studies by her outpatient labs. Here she has some hyponatremia of 130, she has an elevated troponin at this time as well but is currently without chest pain. She does have some T wave inversions in V2 V3. Cardiology was consulted as well and will monitor the patient during her admission and stay. CT abdomen pelvis shows no signs of a small bowel obstruction at this time. Vitals Reviewed:    Vitals:    03/03/23 1455 03/03/23 1505 03/03/23 1527 03/03/23 1739   BP: (!) 88/40 (!) 103/36 (!) 105/35 (!) 105/47   Pulse: 79 78 79 80   Resp: 20 20 16 13   Temp: 99.4 °F (37.4 °C)      TempSrc: Oral      SpO2: 93% 100% 95% 94%   Weight: 99 lb 14.4 oz (45.3 kg)      Height: 4' 11.5\" (1.511 m)          The patient was seen and examined. Appropriate diagnostic testing was performed and results reviewed with the patient. The results of pertinent diagnostic studies and exam findings were discussed. The patients provisional diagnosis and plan of care were discussed with the patient and present family who expressed understanding. Any medications were reviewed and indications and risks of medications were discussed with the patient /family present. Strict verbal and written return precautions, instructions and appropriate follow-up provided to  the patient .      ED Medications administered this visit:  (None if blank)  Medications   0.9 % sodium chloride bolus (0 mLs IntraVENous Stopped 3/3/23 1727)   iopamidol (ISOVUE-370) 76 % injection 75 mL (75 mLs IntraVENous Given 3/3/23 1631)         PROCEDURES: (None if blank)  Procedures:     CRITICAL CARE: (None if blank)      DISCHARGE PRESCRIPTIONS: (None if blank)  New Prescriptions No medications on file       FINAL IMPRESSION      1. Elevated troponin    2. General weakness    3.  Hyponatremia            DISPOSITION/PLAN   DISPOSITION Admitted 03/03/2023 05:19:43 PM      OUTPATIENT FOLLOW UP THE PATIENT:  [facility]  [address]  [facility] [address] [phone]        MD Makenna Wild MD  Resident  03/03/23 2700

## 2023-03-03 NOTE — ED NOTES
Contacted Dr. Rosado Face per Dr. Cedeno Fitting request.     [facility]  03/03/23 4545

## 2023-03-04 LAB
25(OH)D3 SERPL-MCNC: 44.5 NG/ML
ABSOLUTE EOS #: 0.07 K/UL (ref 0–0.44)
ABSOLUTE IMMATURE GRANULOCYTE: 0.04 K/UL (ref 0–0.3)
ABSOLUTE LYMPH #: 1.05 K/UL (ref 1.1–3.7)
ABSOLUTE MONO #: 0.53 K/UL (ref 0.1–1.2)
ALBUMIN SERPL-MCNC: 2.8 G/DL (ref 3.5–5.2)
ALBUMIN/GLOBULIN RATIO: 1.3 (ref 1–2.5)
ALP SERPL-CCNC: 73 U/L (ref 35–104)
ALT SERPL-CCNC: 13 U/L (ref 5–33)
ANION GAP SERPL CALCULATED.3IONS-SCNC: 7 MMOL/L (ref 9–17)
AST SERPL-CCNC: 13 U/L
BACTERIA: ABNORMAL
BASOPHILS # BLD: 1 % (ref 0–2)
BASOPHILS ABSOLUTE: 0.03 K/UL (ref 0–0.2)
BILIRUB SERPL-MCNC: 0.7 MG/DL (ref 0.3–1.2)
BILIRUBIN URINE: NEGATIVE
BUN SERPL-MCNC: 12 MG/DL (ref 8–23)
BUN/CREAT BLD: 26 (ref 9–20)
CALCIUM SERPL-MCNC: 8.2 MG/DL (ref 8.6–10.4)
CHLORIDE SERPL-SCNC: 96 MMOL/L (ref 98–107)
CK SERPL-CCNC: 25 U/L (ref 26–192)
CO2 SERPL-SCNC: 28 MMOL/L (ref 20–31)
COLOR: YELLOW
CORTISOL: 10.9 UG/DL (ref 2.7–18.4)
CREAT SERPL-MCNC: 0.46 MG/DL (ref 0.5–0.9)
EKG ATRIAL RATE: 76 BPM
EKG P AXIS: 62 DEGREES
EKG P-R INTERVAL: 154 MS
EKG Q-T INTERVAL: 422 MS
EKG QRS DURATION: 86 MS
EKG QTC CALCULATION (BAZETT): 474 MS
EKG R AXIS: 3 DEGREES
EKG T AXIS: 47 DEGREES
EKG VENTRICULAR RATE: 76 BPM
EOSINOPHILS RELATIVE PERCENT: 1 % (ref 1–4)
EPITHELIAL CELLS UA: ABNORMAL /HPF (ref 0–25)
FOLATE SERPL-MCNC: 17 NG/ML
GFR SERPL CREATININE-BSD FRML MDRD: >60 ML/MIN/1.73M2
GLUCOSE SERPL-MCNC: 144 MG/DL (ref 70–99)
GLUCOSE UR STRIP.AUTO-MCNC: NEGATIVE MG/DL
HCT VFR BLD AUTO: 25.9 % (ref 36.3–47.1)
HGB BLD-MCNC: 8.5 G/DL (ref 11.9–15.1)
IMMATURE GRANULOCYTES: 1 %
KETONES UR STRIP.AUTO-MCNC: NEGATIVE MG/DL
LEUKOCYTE ESTERASE UR QL STRIP.AUTO: ABNORMAL
LYMPHOCYTES # BLD: 20 % (ref 24–43)
MCH RBC QN AUTO: 30.6 PG (ref 25.2–33.5)
MCHC RBC AUTO-ENTMCNC: 32.8 G/DL (ref 28.4–34.8)
MCV RBC AUTO: 93.2 FL (ref 82.6–102.9)
MONOCYTES # BLD: 10 % (ref 3–12)
NITRITE UR QL STRIP.AUTO: NEGATIVE
NRBC AUTOMATED: 0 PER 100 WBC
OSMOLALITY URINE: 345 MOSM/KG (ref 80–1300)
PDW BLD-RTO: 14.6 % (ref 11.8–14.4)
PLATELET # BLD AUTO: 249 K/UL (ref 138–453)
PMV BLD AUTO: 10.5 FL (ref 8.1–13.5)
POTASSIUM SERPL-SCNC: 3.9 MMOL/L (ref 3.7–5.3)
PROT SERPL-MCNC: 5 G/DL (ref 6.4–8.3)
PROT UR STRIP.AUTO-MCNC: 7.5 MG/DL (ref 5–9)
PROT UR STRIP.AUTO-MCNC: NEGATIVE MG/DL
RBC # BLD: 2.78 M/UL (ref 3.95–5.11)
RBC CLUMPS #/AREA URNS AUTO: ABNORMAL /HPF (ref 0–2)
SEG NEUTROPHILS: 67 % (ref 36–65)
SEGMENTED NEUTROPHILS ABSOLUTE COUNT: 3.56 K/UL (ref 1.5–8.1)
SERUM OSMOLALITY: 277 MOSM/KG (ref 275–295)
SODIUM SERPL-SCNC: 131 MMOL/L (ref 135–144)
SODIUM,UR: 50 MMOL/L
SPECIFIC GRAVITY UA: <1.005 (ref 1.01–1.02)
TROPONIN I SERPL DL<=0.01 NG/ML-MCNC: 33 NG/L (ref 0–14)
TROPONIN I SERPL DL<=0.01 NG/ML-MCNC: 41 NG/L (ref 0–14)
TSH SERPL-ACNC: 0.74 UIU/ML (ref 0.3–5)
TURBIDITY: CLEAR
URINE HGB: NEGATIVE
UROBILINOGEN, URINE: NORMAL
VIT B12 SERPL-MCNC: 1368 PG/ML (ref 232–1245)
WBC # BLD AUTO: 5.3 K/UL (ref 3.5–11.3)
WBC UA: ABNORMAL /HPF (ref 0–5)

## 2023-03-04 PROCEDURE — 87086 URINE CULTURE/COLONY COUNT: CPT

## 2023-03-04 PROCEDURE — 82746 ASSAY OF FOLIC ACID SERUM: CPT

## 2023-03-04 PROCEDURE — 82306 VITAMIN D 25 HYDROXY: CPT

## 2023-03-04 PROCEDURE — 94761 N-INVAS EAR/PLS OXIMETRY MLT: CPT

## 2023-03-04 PROCEDURE — 6360000002 HC RX W HCPCS: Performed by: STUDENT IN AN ORGANIZED HEALTH CARE EDUCATION/TRAINING PROGRAM

## 2023-03-04 PROCEDURE — 97162 PT EVAL MOD COMPLEX 30 MIN: CPT

## 2023-03-04 PROCEDURE — 6370000000 HC RX 637 (ALT 250 FOR IP): Performed by: STUDENT IN AN ORGANIZED HEALTH CARE EDUCATION/TRAINING PROGRAM

## 2023-03-04 PROCEDURE — 82533 TOTAL CORTISOL: CPT

## 2023-03-04 PROCEDURE — 87186 SC STD MICRODIL/AGAR DIL: CPT

## 2023-03-04 PROCEDURE — 97535 SELF CARE MNGMENT TRAINING: CPT

## 2023-03-04 PROCEDURE — 82607 VITAMIN B-12: CPT

## 2023-03-04 PROCEDURE — 97530 THERAPEUTIC ACTIVITIES: CPT

## 2023-03-04 PROCEDURE — 36415 COLL VENOUS BLD VENIPUNCTURE: CPT

## 2023-03-04 PROCEDURE — 85025 COMPLETE CBC W/AUTO DIFF WBC: CPT

## 2023-03-04 PROCEDURE — 87077 CULTURE AEROBIC IDENTIFY: CPT

## 2023-03-04 PROCEDURE — 2580000003 HC RX 258: Performed by: STUDENT IN AN ORGANIZED HEALTH CARE EDUCATION/TRAINING PROGRAM

## 2023-03-04 PROCEDURE — 80053 COMPREHEN METABOLIC PANEL: CPT

## 2023-03-04 PROCEDURE — 84443 ASSAY THYROID STIM HORMONE: CPT

## 2023-03-04 PROCEDURE — 1200000000 HC SEMI PRIVATE

## 2023-03-04 PROCEDURE — 97166 OT EVAL MOD COMPLEX 45 MIN: CPT

## 2023-03-04 PROCEDURE — 82550 ASSAY OF CK (CPK): CPT

## 2023-03-04 PROCEDURE — 93010 ELECTROCARDIOGRAM REPORT: CPT | Performed by: INTERNAL MEDICINE

## 2023-03-04 RX ADMIN — PANTOPRAZOLE SODIUM 40 MG: 40 TABLET, DELAYED RELEASE ORAL at 09:19

## 2023-03-04 RX ADMIN — SODIUM CHLORIDE: 9 INJECTION, SOLUTION INTRAVENOUS at 21:50

## 2023-03-04 RX ADMIN — CEFTRIAXONE 1000 MG: 1 INJECTION, POWDER, FOR SOLUTION INTRAMUSCULAR; INTRAVENOUS at 09:16

## 2023-03-04 RX ADMIN — SODIUM CHLORIDE: 9 INJECTION, SOLUTION INTRAVENOUS at 09:15

## 2023-03-04 RX ADMIN — Medication 2000 UNITS: at 09:19

## 2023-03-04 RX ADMIN — ATORVASTATIN CALCIUM 10 MG: 10 TABLET, FILM COATED ORAL at 09:19

## 2023-03-04 RX ADMIN — ENOXAPARIN SODIUM 30 MG: 100 INJECTION SUBCUTANEOUS at 09:19

## 2023-03-04 ASSESSMENT — PAIN SCALES - GENERAL
PAINLEVEL_OUTOF10: 0
PAINLEVEL_OUTOF10: 0

## 2023-03-04 NOTE — PROGRESS NOTES
Occupational Therapy  Facility/Department: [facility] AT THE [facility] SURG  Occupational Therapy Initial Assessment    Name: Mari aD Johnson  : 1935  MRN: [id number]  Date of Service: 3/4/2023    Discharge Recommendations:  [address]           Patient Diagnosis(es): The primary encounter diagnosis was Elevated troponin. Diagnoses of General weakness and Hyponatremia were also pertinent to this visit. Past Medical History:  has a past medical history of Arthritis, Diabetes mellitus ([address] Dysphagia, GERD (gastroesophageal reflux disease), HTN (hypertension), Hyperlipidemia, Hypertension, and Type 2 diabetes mellitus ([facility] Past Surgical History:  has a past surgical history that includes Total knee arthroplasty (Left); Rotator cuff repair (Right); Partial hysterectomy; Cholecystectomy, laparoscopic; Colonoscopy (); Colonoscopy (2016); Upper gastrointestinal endoscopy (2016); Cardiac catheterization (?); Colonoscopy (2019); Colonoscopy (N/A, 2019); Upper gastrointestinal endoscopy (2020); Upper gastrointestinal endoscopy (N/A, 2020); fracture surgery (Right, 2023); and hernia repair (2020). Treatment Diagnosis: Hyponatremia      Assessment   Performance deficits / Impairments: Decreased functional mobility ; Decreased ADL status; Decreased strength  Assessment: Due to above performance deficits, OT is recommended.   Treatment Diagnosis: Hyponatremia  Prognosis: Good  Decision Making: Medium Complexity  Assistance / Modification: Mod-Max A  REQUIRES OT FOLLOW-UP: Yes  Activity Tolerance  Activity Tolerance: Patient Tolerated treatment well        Plan   Occupational Therapy Plan  Times Per Week: Daily  Current Treatment Recommendations: Self-Care / ADL, Strengthening, Endurance training, Functional mobility training     Restrictions  Restrictions/Precautions  Restrictions/Precautions: General Precautions, Fall Risk, Weight Bearing  Lower Extremity Weight Bearing Restrictions  Right Lower Extremity Weight Bearing: Weight Bearing As Tolerated    Subjective   General  Chart Reviewed: Yes  Patient assessed for rehabilitation services?: Yes  Response to previous treatment: Patient with no complaints from previous session  Family / Caregiver Present: No     Social/Functional History  Social/Functional History  Lives With: Alone  Type of Home: House  Home Layout: Two level, Able to Live on Main level with bedroom/bathroom  Home Access: Stairs to enter with rails  Entrance Stairs - Number of Steps: 2  Entrance Stairs - Rails: Left  Bathroom Shower/Tub: Walk-in shower  Bathroom Toilet: Handicap height  Bathroom Equipment: Grab bars in shower, 3-in-1 commode  Bathroom Accessibility: Accessible  Home Equipment: Janyce Paddy, rolling, Wheelchair-electric, [address] Help From: Family  ADL Assistance: Independent  Homemaking Assistance: Independent  Homemaking Responsibilities: Yes  Ambulation Assistance: Independent  Transfer Assistance: Independent  Active : Yes  Mode of Transportation: Car  Occupation: Retired  Leisure & Hobbies: Bingo, movies, read.        Objective   Heart Rate: 69  Heart Rate Source: Monitor  BP: (!) 118/54  BP Location: Right upper arm  BP Method: Automatic  Patient Position: Left side;Semi fowlers  MAP (Calculated): 75  Resp: 16  SpO2: 96 %  O2 Device: None (Room air)             Safety Devices  Type of Devices: Chair alarm in place;Call light within reach  Balance  Sitting: Intact  Standing: With support  Gait  Overall Level of Assistance: Maximum assistance        ADL  Feeding: Independent  Grooming: Setup  UE Bathing: Setup  LE Bathing: Maximum assistance  UE Dressing: Setup  LE Dressing: Maximum assistance  Toileting: Maximum assistance     Activity Tolerance  Activity Tolerance: Patient tolerated evaluation without incident     Transfers  Stand Pivot Transfers: Maximum assistance  Vision  Vision: Impaired  Vision Exceptions: Wears glasses for reading  Hearing  Hearing: Within functional limits  Cognition  Overall Cognitive Status: WFL  Orientation  Overall Orientation Status: Within Functional Limits                  Education Given To: Patient; Family  Education Provided: Role of Therapy;Plan of Care  Education Method: Verbal  Barriers to Learning: None  Education Outcome: Verbalized understanding                        G-Code     OutComes Score                                                  AM-PAC Score             Tinneti Score       Goals  Short Term Goals  Time Frame for Short Term Goals: 1 week  Short Term Goal 1: Pt. will tolerate 15-30 min. functional activity to improve strength for ADL's. Short Term Goal 2: Pt. will engage in self care to increase Ind. Short Term Goal 3: Pt. will state understanding to safety as relates to ADL's. Long Term Goals  Time Frame for Long Term Goals : 4 weeks  Long Term Goal 1: Pt. will return to PTA ADL status.   Patient Goals   Patient goals : Return to rehab       Therapy Time   Individual Concurrent Group Co-treatment   Time In 1030         Time Out 1110         Minutes 40         Timed Code Treatment Minutes: 20 Minutes       Rylee Jeffries, OT

## 2023-03-04 NOTE — PROGRESS NOTES
Physical Therapy  Facility/Department: [facility] AT THE [facility]  Physical Therapy Initial Assessment    Name: Darnell Elizabeth  : 1935  MRN: [id number]  Date of Service: 3/4/2023    Discharge Recommendations:  Subacute/Skilled Nursing Facility, Continue to assess pending progress   PT Equipment Recommendations  Equipment Needed: No      Patient Diagnosis(es): The primary encounter diagnosis was Elevated troponin. Diagnoses of General weakness and Hyponatremia were also pertinent to this visit. Past Medical History:  has a past medical history of Arthritis, Diabetes mellitus ([facility] Dysphagia, GERD (gastroesophageal reflux disease), HTN (hypertension), Hyperlipidemia, Hypertension, and Type 2 diabetes mellitus ([facility] Past Surgical History:  has a past surgical history that includes Total knee arthroplasty (Left); Rotator cuff repair (Right); Partial hysterectomy; Cholecystectomy, laparoscopic; Colonoscopy (); Colonoscopy (2016); Upper gastrointestinal endoscopy (2016); Cardiac catheterization (?); Colonoscopy (2019); Colonoscopy (N/A, 2019); Upper gastrointestinal endoscopy (2020); Upper gastrointestinal endoscopy (N/A, 2020); fracture surgery (Right, 2023); and hernia repair (2020). Assessment   Body Structures, Functions, Activity Limitations Requiring Skilled Therapeutic Intervention: Decreased functional mobility ; Decreased ADL status; Decreased ROM; Decreased strength;Decreased endurance;Decreased balance;Decreased high-level IADLs  Assessment: The patient is an 81 y.o. female who recently suffered a R femur fx requiring ORIF. She was admitted to the hospital due to hyponatremia. She demonstrates LE weakness, decreased activity endurance, and impaired balance. She would benefit from skilled PT to address her deficits to improve functional mobility.   Treatment Diagnosis: Generalized weakness, decreased activity endurance  Therapy Prognosis: Fair  Decision Making: Medium Complexity  Requires PT Follow-Up: Yes  Activity Tolerance  Activity Tolerance: Patient tolerated evaluation without incident     Plan   Physcial Therapy Plan  General Plan: 2 times a day 7 days a week (1x/day on weekends)  Current Treatment Recommendations: Strengthening, ROM, Balance training, Functional mobility training, Transfer training, ADL/Self-care training, IADL training, Stair training, Gait training, Endurance training, Neuromuscular re-education, Manual, Pain management, Home exercise program, Safety education & training, Patient/Caregiver education & training, Modalities, Therapeutic activities  Safety Devices  Type of Devices: Chair alarm in place, Call light within reach     Restrictions  Restrictions/Precautions  Restrictions/Precautions: General Precautions, Fall Risk, Weight Bearing  Lower Extremity Weight Bearing Restrictions  Right Lower Extremity Weight Bearing: Weight Bearing As Tolerated     Subjective   General  Chart Reviewed: Yes  Patient assessed for rehabilitation services?: Yes  Family / Caregiver Present: No  Referring Practitioner: Tari Avalos MD  Referral Date : 03/03/23  Diagnosis: Hyponatremia, E87.1  Follows Commands: Within Functional Limits  Subjective  Subjective: Patient denies pain upon PT arrival.         Social/Functional History  Social/Functional History  Lives With: Alone  Type of Home: House  Home Layout: Two level, Able to Live on Main level with bedroom/bathroom  Home Access: Stairs to enter with rails  Entrance Stairs - Number of Steps: 2  Entrance Stairs - Rails: Left  Bathroom Shower/Tub: Walk-in shower  Bathroom Toilet: Handicap height  Bathroom Equipment: Grab bars in shower, 3-in-1 commode  Bathroom Accessibility: Accessible  Home Equipment: Alphonso Jo, adriana, Wheelchair-electric, [address] Help From: Family  ADL Assistance: Independent  Homemaking Assistance: Independent  Homemaking Responsibilities: Yes  Ambulation Assistance: Independent  Transfer Assistance: Independent  Active : Yes  Mode of Transportation: Car  Occupation: Retired  Leisure & Hobbies: Bingo, movies, read. Vision/Hearing  Vision  Vision: Impaired  Vision Exceptions: Wears glasses for reading  Hearing  Hearing: Within functional limits    Cognition   WFL    Objective   Heart Rate: 69  Heart Rate Source: Monitor  BP: (!) 118/54  BP Location: Right upper arm  BP Method: Automatic  Patient Position: Left side;Semi fowlers  MAP (Calculated): 75  Resp: 16  SpO2: 96 %  O2 Device: None (Room air)      AROM RLE (degrees)  RLE General AROM: Pt able to flex hip to 90* and had full extension with standing  AROM LLE (degrees)  LLE AROM : WFL  Strength RLE  Strength RLE: Exception  Comment: Grossly 4-/5  Strength LLE  Strength LLE: WFL        Balance  Sitting: Intact  Standing: With support  Gait  Overall Level of Assistance: Maximum assistance  Bed mobility  Supine to Sit: Minimal assistance  Scooting: Contact guard assistance  Transfers  Sit to Stand: Moderate Assistance  Stand to Sit: Moderate Assistance  Ambulation  WB Status: WBAT R LE  Ambulation  Surface: Level tile  Device: No Device  Assistance:  Moderate assistance  Gait Deviations: Decreased step length;Decreased step height  Distance: 2' to bedside chair  More Ambulation?: No  Stairs/Curb  Stairs?: No     Balance  Posture: Poor  Sitting - Static: Fair  Sitting - Dynamic: Fair  Standing - Static: Poor  Standing - Dynamic: Poor    AM-PAC Score  AM-PAC Inpatient Mobility without Stair Climbing Raw Score : 10 (03/04/23 1114)  AM-PAC Inpatient without Stair Climbing T-Scale Score : 34.07 (03/04/23 1114)  Mobility Inpatient CMS 0-100% Score: 71.66 (03/04/23 1114)  Mobility Inpatient without Stair CMS G-Code Modifier : CL (03/04/23 1114)    Goals  Short Term Goals  Time Frame for Short Term Goals: 10 days  Short Term Goal 1: Patient will ambulate 8' with FWW, CGA, without LOB  Short Term Goal 2: Patient will perform transfers and bed mobility with CGA  Short Term Goal 3: Patient will tolerate 20-30 minutes of therex/act to improve endurance for ADLs.   Patient Goals   Patient Goals : Improve mobility       Education  Patient Education  Education Given To: Patient  Education Provided: Role of Therapy;Plan of Care  Education Method: Verbal  Barriers to Learning: None  Education Outcome: Verbalized understanding      Therapy Time   Individual Concurrent Group Co-treatment   Time In 1035         Time Out 1100         Minutes 25         Timed Code Treatment Minutes: 1615 Chely Sen, PT, DPT

## 2023-03-04 NOTE — PROGRESS NOTES
Pt resting in bed with eyes closed upon entering room. Denies pain or discomfort at this time. VS and assessment as charted. Call light in reach, will continue to monitor.

## 2023-03-04 NOTE — PROGRESS NOTES
Writer requested to do incentive spirometer in AM. States she is really tired. Will pass along in report.

## 2023-03-04 NOTE — PROGRESS NOTES
Patient changed at this time. Noted to be incontinent of urine. Brief changed. Patient repositioned and turned.

## 2023-03-04 NOTE — PLAN OF CARE
Problem: Discharge Planning  Goal: Discharge to home or other facility with appropriate resources  3/4/2023 1005 by Morenita Serna RN  Outcome: Progressing     Problem: Safety - Adult  Goal: Free from fall injury  3/4/2023 1005 by Morenita Serna RN  Outcome: Progressing     Problem: ABCDS Injury Assessment  Goal: Absence of physical injury  3/4/2023 1005 by Morenita Serna RN  Outcome: Progressing     Problem: Skin/Tissue Integrity  Goal: Absence of new skin breakdown  Description: 1. Monitor for areas of redness and/or skin breakdown  2. Assess vascular access sites hourly  3. Every 4-6 hours minimum:  Change oxygen saturation probe site  4. Every 4-6 hours:  If on nasal continuous positive airway pressure, respiratory therapy assess nares and determine need for appliance change or resting period.   3/4/2023 1005 by Morenita Serna RN  Outcome: Progressing     Problem: Pain  Goal: Verbalizes/displays adequate comfort level or baseline comfort level  3/4/2023 1005 by Morenita Serna RN  Outcome: Progressing

## 2023-03-04 NOTE — PROGRESS NOTES
[address] [facility] [address] [facility], [address]    Progress Note    Date:   3/4/2023  Patient name:  Lanier Apley  Date of admission:  3/3/2023  2:51 PM  MRN:   [id number]  YOB: 1935    SUBJECTIVE/Last 24 hours update:     Patient seen and examined at the bed side , no new acute events overnight noted, no new complains except for ongoing weakness, improving compared to prior. Notes from nursing staff and Consults had been reviewed, and the overnight progress had been checked with the nursing staff as well. REVIEW OF SYSTEMS:      CONSTITUTIONAL:  no fevers, no headcahes  EYES: negative for blury vision  HEENT: No headaches, No nasal congestion, no difficulty swallowing  RESPIRATORY:negative for dyspnea, no wheezing, no Cough  CARDIOVASCULAR: negative for chest pain, no palpitations  GASTROINTESTINAL: no nausea, no vomiting, no change in bowel habits, no abdominal pain   GENITOURINARY: negative for dysuria, no hematuria   MUSCULOSKELETAL: no joint pains, no muscle aches, no swelling of joints or extremities  NEUROLOGICAL: generalized Weakness and no numbness      PAST MEDICAL HISTORY:      has a past medical history of Arthritis, Diabetes mellitus ([address] Dysphagia, GERD (gastroesophageal reflux disease), HTN (hypertension), Hyperlipidemia, Hypertension, and Type 2 diabetes mellitus ([address] PAST SURGICAL HISTORY:      has a past surgical history that includes Total knee arthroplasty (Left); Rotator cuff repair (Right); Partial hysterectomy; Cholecystectomy, laparoscopic; Colonoscopy (2008); Colonoscopy (01/26/2016); Upper gastrointestinal endoscopy (01/26/2016); Cardiac catheterization (2007?); Colonoscopy (11/20/2019); Colonoscopy (N/A, 11/20/2019); Upper gastrointestinal endoscopy (01/20/2020); Upper gastrointestinal endoscopy (N/A, 01/20/2020); fracture surgery (Right, 02/16/2023); and hernia repair (09/2020).        SOCIAL HISTORY:      reports that she quit smoking about 54 years ago. Her smoking use included cigarettes. She has never used smokeless tobacco. She reports current alcohol use of about 1.0 standard drink per week. She reports that she does not use drugs. TOBACCO:   reports that she quit smoking about 54 years ago. Her smoking use included cigarettes. She has never used smokeless tobacco.  ETOH:   reports current alcohol use of about 1.0 standard drink per week. FAMILY HISTORY:     family history includes Arthritis in her mother; COPD in her father; Cancer in her mother; Colon Polyps in her brother; Diabetes in her father and mother; Heart Attack in her father; Heart Disease in her father; High Blood Pressure in her mother. Problem Relation Age of Onset    Arthritis Mother     Diabetes Mother     Cancer Mother         uterine    High Blood Pressure Mother     Heart Attack Father     Heart Disease Father     Diabetes Father     COPD Father     Colon Polyps Brother        HOME MEDICATIONS:      Prior to Admission medications    Medication Sig Start Date End Date Taking?  Authorizing Provider   acetaminophen (TYLENOL) 650 MG suppository Place 650 mg rectally every 4 hours as needed for Fever or Pain   Yes Historical Provider, MD   atorvastatin (LIPITOR) 10 MG tablet Take 10 mg by mouth at bedtime   Yes Historical Provider, MD   bisacodyl (DULCOLAX) 10 MG suppository Place 10 mg rectally daily as needed for Constipation (as needed for bowel protocol)   Yes Historical Provider, MD   calcium carbonate (TUMS EX) 750 MG chewable tablet Take 1 tablet by mouth every 6 hours as needed (as needed for GI upset)   Yes Historical Provider, MD   dicyclomine (BENTYL) 10 MG capsule Take 10 mg by mouth 4 times daily (before meals and nightly)   Yes Historical Provider, MD   enoxaparin (LOVENOX) 40 MG/0.4ML Inject into the skin daily   Yes Historical Provider, MD   famotidine (PEPCID) 20 MG tablet Take 20 mg by mouth 2 times daily   Yes Historical Provider, MD   sodium phosphate (FLEET) 7-19 GM/118ML Place 1 enema rectally once as needed (As needed for bowel protocl)   Yes Historical Provider, MD   loperamide (IMODIUM) 2 MG capsule Take 2 mg by mouth 4 times daily as needed for Diarrhea 4mg PO initial dose and 2mg PRN after each unformed stool. Max dose of 16mg per 24 hour period   Yes Historical Provider, MD   potassium chloride (MICRO-K) 10 MEQ extended release capsule Take 10 mEq by mouth daily   Yes Historical Provider, MD   magnesium hydroxide (MILK OF MAGNESIA) 400 MG/5ML suspension Take 5 mLs by mouth daily as needed for Constipation (as needed for bowel protocol)   Yes Historical Provider, MD   aluminum & magnesium hydroxide-simethicone (MAALOX) 200-200-20 MG/5ML SUSP suspension Take 5 mLs by mouth every 6 hours as needed for Indigestion (for GI upset)   Yes Historical Provider, MD   nitroGLYCERIN (NITROSTAT) 0.4 MG SL tablet Place 0.4 mg under the tongue every 5 minutes as needed for Chest pain up to max of 3 total doses. If no relief after 1 dose, call 911.    Yes Historical Provider, MD   ondansetron (ZOFRAN) 8 MG tablet Take 8 mg by mouth every 8 hours as needed for Nausea or Vomiting   Yes Historical Provider, MD   acetaminophen (TYLENOL) 325 MG tablet Take 650 mg by mouth every 4 hours as needed for Pain or Fever   Yes Historical Provider, MD   sertraline (ZOLOFT) 50 MG tablet Take 50 mg by mouth at bedtime   Yes Historical Provider, MD   metFORMIN (GLUCOPHAGE) 1000 MG tablet Take 1,000 mg by mouth 2 times daily (with meals)    Historical Provider, MD   sitaGLIPtin (JANUVIA) 100 MG tablet Take 100 mg by mouth daily    Historical Provider, MD   Omeprazole (PRILOSEC PO) Take 40 mg by mouth daily    Historical Provider, MD   amLODIPine-benazepril (LOTREL) 5-10 MG per capsule Take 1 capsule by mouth daily    Historical Provider, MD   metoprolol (LOPRESSOR) 50 MG tablet Take 50 mg by mouth 2 times daily    Historical Provider, MD       ALLERGIES:     Patient has no known allergies. OBJECTIVE:       Vitals:    03/03/23 1758 03/03/23 1900 03/04/23 0000 03/04/23 0700   BP: (!) 110/56 (!) 98/52 (!) 118/58 (!) 118/54   Pulse: 79 71 66 69   Resp: 16 18 16 16   Temp: 99 °F (37.2 °C) 99.7 °F (37.6 °C) 97.6 °F (36.4 °C) 98.2 °F (36.8 °C)   TempSrc: Temporal Temporal Temporal Temporal   SpO2:  97% 98% 96%   Weight: 100 lb 12 oz (45.7 kg)      Height: 4' 11.5\" (1.511 m)            Intake/Output Summary (Last 24 hours) at 3/4/2023 0835  Last data filed at 3/4/2023 3991  Gross per 24 hour   Intake 838.6 ml   Output 350 ml   Net 488.6 ml       PHYSICAL EXAM:  General Appearance  Alert , awake , not in acute distress  HEENT - Head is normocephalic, atraumatic. Lungs - Bilateral equal air entry , no wheezes, rales or rhonchi, aeration good  Cardiovascular - Heart sounds are normal.  Regular rhythm, normal rate without murmur, gallop or rub.   Abdomen - Soft, nontender, nondistended, no masses or organomegaly  Neurologic - There are no new focal motor or sensory deficits  Skin - No bruising or bleeding on exposed skin area, dressing c/d/I  Extremities - No cyanosis, clubbing or edema      DIAGNOSTICS:     Laboratory Testing:    See [facility] EMR for lab data    Recent Results (from the past 24 hour(s))   EKG 12 Lead    Collection Time: 03/03/23  2:55 PM   Result Value Ref Range    Ventricular Rate 76 BPM    Atrial Rate 76 BPM    P-R Interval 154 ms    QRS Duration 86 ms    Q-T Interval 422 ms    QTc Calculation (Bazett) 474 ms    P Axis 62 degrees    R Axis 3 degrees    T Axis 47 degrees   CBC with Auto Differential    Collection Time: 03/03/23  3:18 PM   Result Value Ref Range    WBC 10.6 3.5 - 11.3 k/uL    RBC 3.17 (L) 3.95 - 5.11 m/uL    Hemoglobin 9.9 (L) 11.9 - 15.1 g/dL    Hematocrit 29.6 (L) 36.3 - 47.1 %    MCV 93.4 82.6 - 102.9 fL    MCH 31.2 25.2 - 33.5 pg    MCHC 33.4 28.4 - 34.8 g/dL    RDW 14.6 (H) 11.8 - 14.4 %    Platelets 221 641 - 476 k/uL    MPV 10.6 8.1 - 13.5 fL    NRBC Automated 0.0 0.0 per 100 WBC    Seg Neutrophils 82 (H) 36 - 65 %    Lymphocytes 7 (L) 24 - 43 %    Monocytes 10 3 - 12 %    Eosinophils % 0 (L) 1 - 4 %    Basophils 0 0 - 2 %    Immature Granulocytes 1 (H) 0 %    Segs Absolute 8.75 (H) 1.50 - 8.10 k/uL    Absolute Lymph # 0.74 (L) 1.10 - 3.70 k/uL    Absolute Mono # 1.01 0.10 - 1.20 k/uL    Absolute Eos # 0.03 0.00 - 0.44 k/uL    Basophils Absolute <0.03 0.00 - 0.20 k/uL    Absolute Immature Granulocyte 0.08 0.00 - 0.30 k/uL   Comprehensive Metabolic Panel    Collection Time: 03/03/23  3:18 PM   Result Value Ref Range    Glucose 182 (H) 70 - 99 mg/dL    BUN 17 8 - 23 mg/dL    Creatinine 0.59 0.50 - 0.90 mg/dL    Est, Glom Filt Rate >60 >60 mL/min/1.73m2    Bun/Cre Ratio 29 (H) 9 - 20    Calcium 8.1 (L) 8.6 - 10.4 mg/dL    Sodium 130 (L) 135 - 144 mmol/L    Potassium 3.8 3.7 - 5.3 mmol/L    Chloride 91 (L) 98 - 107 mmol/L    CO2 31 20 - 31 mmol/L    Anion Gap 8 (L) 9 - 17 mmol/L    Alkaline Phosphatase 82 35 - 104 U/L    ALT 17 5 - 33 U/L    AST 15 <32 U/L    Total Bilirubin 0.8 0.3 - 1.2 mg/dL    Total Protein 5.6 (L) 6.4 - 8.3 g/dL    Albumin 3.1 (L) 3.5 - 5.2 g/dL    Albumin/Globulin Ratio 1.2 1.0 - 2.5   Troponin    Collection Time: 03/03/23  3:18 PM   Result Value Ref Range    Troponin, High Sensitivity 48 (H) 0 - 14 ng/L   CBC auto differential    Collection Time: 03/04/23  5:50 AM   Result Value Ref Range    WBC 5.3 3.5 - 11.3 k/uL    RBC 2.78 (L) 3.95 - 5.11 m/uL    Hemoglobin 8.5 (L) 11.9 - 15.1 g/dL    Hematocrit 25.9 (L) 36.3 - 47.1 %    MCV 93.2 82.6 - 102.9 fL    MCH 30.6 25.2 - 33.5 pg    MCHC 32.8 28.4 - 34.8 g/dL    RDW 14.6 (H) 11.8 - 14.4 %    Platelets 137 905 - 915 k/uL    MPV 10.5 8.1 - 13.5 fL    NRBC Automated 0.0 0.0 per 100 WBC    Seg Neutrophils 67 (H) 36 - 65 %    Lymphocytes 20 (L) 24 - 43 %    Monocytes 10 3 - 12 %    Eosinophils % 1 1 - 4 %    Basophils 1 0 - 2 %    Immature Granulocytes 1 (H) 0 %    Segs Absolute 3.56 1.50 - 8.10 k/uL Absolute Lymph # 1.05 (L) 1.10 - 3.70 k/uL    Absolute Mono # 0.53 0.10 - 1.20 k/uL    Absolute Eos # 0.07 0.00 - 0.44 k/uL    Basophils Absolute 0.03 0.00 - 0.20 k/uL    Absolute Immature Granulocyte 0.04 0.00 - 0.30 k/uL   Comprehensive Metabolic Panel w/ Reflex to MG    Collection Time: 03/04/23  5:50 AM   Result Value Ref Range    Glucose 144 (H) 70 - 99 mg/dL    BUN 12 8 - 23 mg/dL    Creatinine 0.46 (L) 0.50 - 0.90 mg/dL    Est, Glom Filt Rate >60 >60 mL/min/1.73m2    Bun/Cre Ratio 26 (H) 9 - 20    Calcium 8.2 (L) 8.6 - 10.4 mg/dL    Sodium 131 (L) 135 - 144 mmol/L    Potassium 3.9 3.7 - 5.3 mmol/L    Chloride 96 (L) 98 - 107 mmol/L    CO2 28 20 - 31 mmol/L    Anion Gap 7 (L) 9 - 17 mmol/L    Alkaline Phosphatase 73 35 - 104 U/L    ALT 13 5 - 33 U/L    AST 13 <32 U/L    Total Bilirubin 0.7 0.3 - 1.2 mg/dL    Total Protein 5.0 (L) 6.4 - 8.3 g/dL    Albumin 2.8 (L) 3.5 - 5.2 g/dL    Albumin/Globulin Ratio 1.3 1.0 - 2.5   Troponin    Collection Time: 03/04/23  5:50 AM   Result Value Ref Range    Troponin, High Sensitivity 41 (H) 0 - 14 ng/L   CK    Collection Time: 03/04/23  5:50 AM   Result Value Ref Range    Total CK 25 (L) 26 - 192 U/L       Current Facility-Administered Medications   Medication Dose Route Frequency Provider Last Rate Last Admin    cefTRIAXone (ROCEPHIN) 1,000 mg in sodium chloride 0.9 % 50 mL IVPB (mini-bag)  1,000 mg IntraVENous Q24H Lacie Nieves MD        [Held by provider] metFORMIN (GLUCOPHAGE) tablet 1,000 mg  1,000 mg Oral Daily with breakfast Lacie Nieves MD        [facility] metoprolol tartrate (LOPRESSOR) tablet 50 mg  50 mg Oral BID Lacie Nieves MD        atorvastatin (LIPITOR) tablet 10 mg  10 mg Oral Daily Lacie Nieves MD   10 mg at 03/03/23 1943    vitamin D (CHOLECALCIFEROL) tablet 2,000 Units  2,000 Units Oral Daily Lacie Nieves MD   2,000 Units at 03/03/23 1943    0.9 % sodium chloride infusion   IntraVENous Continuous Lacie Nieves MD 75 mL/hr at 03/03/23 1907 New Bag at 03/03/23 1907    sodium chloride flush 0.9 % injection 10 mL  10 mL IntraVENous 2 times per day Adore Stern MD   10 mL at 03/03/23 1942    sodium chloride flush 0.9 % injection 10 mL  10 mL IntraVENous PRN Adore Stern MD        0.9 % sodium chloride infusion   IntraVENous PRN Adore Stern MD        enoxaparin Sodium (LOVENOX) injection 30 mg  30 mg SubCUTAneous Daily Adore Stern MD   30 mg at 03/03/23 1943    ondansetron (ZOFRAN-ODT) disintegrating tablet 4 mg  4 mg Oral Q8H PRN Adore Stern MD        Or    ondansetron (ZOFRAN) injection 4 mg  4 mg IntraVENous Q6H PRN Adore Stern MD        polyethylene glycol (GLYCOLAX) packet 17 g  17 g Oral Daily PRN Adore Stern MD        acetaminophen (TYLENOL) tablet 650 mg  650 mg Oral Q6H PRN Adore Stern MD        Or    acetaminophen (TYLENOL) suppository 650 mg  650 mg Rectal Q6H PRN Adore Stern MD        potassium chloride (KLOR-CON M) extended release tablet 40 mEq  40 mEq Oral PRN Adore Stern MD        Or    potassium bicarb-citric acid (EFFER-K) effervescent tablet 40 mEq  40 mEq Oral PRN Adore Stern MD        Or    potassium chloride 10 mEq/100 mL IVPB (Peripheral Line)  10 mEq IntraVENous PRN Adore Stern MD        magnesium sulfate 2000 mg in 50 mL IVPB premix  2,000 mg IntraVENous PRN Adore Stern MD        [Held by provider] amLODIPine (NORVASC) tablet 5 mg  5 mg Oral Daily Adore Stern MD        And    [Held by provider] lisinopril (PRINIVIL;ZESTRIL) tablet 10 mg  10 mg Oral Daily Adore Stern MD        pantoprazole (PROTONIX) tablet 40 mg  40 mg Oral QAM AC Adore Stern MD           ASSESSMENT:     Principal Problem:    Hyponatremia  Active Problems:    Dysphagia    Nausea    Hiatal hernia  Resolved Problems:    * No resolved hospital problems.  *      PLAN:     Primary Problem(s): Hyponatremia  Condition is stable  Treatment plan: Continue current treatment  Imaging: no further imaging studies ordered today  Medications: start Ceftriaxone. , await cultures  Monitor labs, awaiting results this AM  Encourage PO intake, IVF for now  Cont to hold BP meds for now  PT/OT  Dispo pending clin. status    DVT prophylaxis: Lovenox 40 mg SC  GI prophylaxis: Protonix 40 mg daily    Above plan discussed with the patient who agreed to the above plan     Discussed care plan with nurse after getting their input. Please note that this chart was generated using voice recognition Dragon dictation software. Although every effort was made to ensure the accuracy of this automated transcription, some errors in transcription may have occurred.     Tomy Smith MD  3/4/2023 8:35 AM

## 2023-03-04 NOTE — H&P
[address], [address]    History & Physical Examination Note              Date:   3/3/2023  Patient name:  Mathew Coffman  Date of admission:  3/3/2023  2:51 PM  MRN:   [id number]  YOB: 1935    CHIEF COMPLAINT:       Chief Complaint   Patient presents with    Abnormal Lab     Patient presents to the emergency department from [facility] for low sodium levels. Patient is in [facility] for rehab for a right femur fracture. She reports that she has not felt well the past couple days, has felt weak and nauseous. This am had blood work completed with results of sodium level of 127. Also complaint of heartburn after eating lunch today        History Obtained From:  Patient and chart review. HPI:     The patient is a 81 y.o.  female who presented with earlier today after having concerns regarding low sodium levels with associated generalized weakness and fatigue. The patient indicates that since having surgery she has been having a hard time tolerating her PO intake 2/2 to having severe mid-epigastric pain/heartburn. She states that this has been occurring on a daily basis and that more recently (for the past 1 day) that she started feeling slightly better. Her daughter reports minimal amounts of PO intake at times (e.g. milk and pineapple juice). The daughter states the patient is just not feeling like eating. There have been concerns for ongoing dehydration. She denies having any discomfort or leg pain. She has been at [facility] for a history of a right femur fracture and has been working with PT, and she indicates that she is WBAT at this time. In the ED, labs and imaging were obtained and were reviewed. The case was discussed with the ED Provider prior to admission.      PAST MEDICAL HISTORY:        has a past medical history of Arthritis, Diabetes mellitus ([address] Dysphagia, GERD (gastroesophageal reflux disease), HTN (hypertension), Hyperlipidemia, Hypertension, and Type 2 diabetes mellitus ([facility] Diagnosis Date    Arthritis     Diabetes mellitus ([facility]     Dysphagia     GERD (gastroesophageal reflux disease)     HTN (hypertension)     Hyperlipidemia     Hypertension     Type 2 diabetes mellitus ([facility] [address] [facility]      PAST SURGICAL HISTORY:      has a past surgical history that includes Total knee arthroplasty (Left); Rotator cuff repair (Right); Partial hysterectomy; Cholecystectomy, laparoscopic; Colonoscopy (2008); Colonoscopy (01/26/2016); Upper gastrointestinal endoscopy (01/26/2016); Cardiac catheterization (2007?); Colonoscopy (11/20/2019); Colonoscopy (N/A, 11/20/2019); Upper gastrointestinal endoscopy (01/20/2020); Upper gastrointestinal endoscopy (N/A, 01/20/2020); fracture surgery (Right, 02/16/2023); and hernia repair (09/2020). Procedure Laterality Date    CARDIAC CATHETERIZATION  2007? CHOLECYSTECTOMY, LAPAROSCOPIC      COLONOSCOPY  2008    COLONOSCOPY  01/26/2016    -hemorrhoids    COLONOSCOPY  11/20/2019    Dr. Neeru hernandez(normal)diverticulosis,hemorrhoids    COLONOSCOPY N/A 11/20/2019    COLONOSCOPY POLYPECTOMY SNARE/COLD BIOPSY performed by Johana Dior MD at [address] [place] [address] Right 02/16/2023    [address] [place] [address] Drive  09/2020    hiatal    PARTIAL HYSTERECTOMY (CERVIX NOT REMOVED)      ROTATOR CUFF REPAIR Right     TOTAL KNEE ARTHROPLASTY Left     UPPER GASTROINTESTINAL ENDOSCOPY  01/26/2016    -bx,dilation    UPPER GASTROINTESTINAL ENDOSCOPY  01/20/2020    (mild chronic gastritis,neg H-Pylori)hiatal hernia    UPPER GASTROINTESTINAL ENDOSCOPY N/A 01/20/2020    EGD BIOPSY performed by Johana Dior MD at [place]:     family history includes Arthritis in her mother; COPD in her father; Cancer in her mother; Colon Polyps in her brother; Diabetes in her father and mother; Heart Attack in her father; Heart Disease in her father; High Blood Pressure in her mother. Problem Relation Age of Onset    Arthritis Mother     Diabetes Mother     Cancer Mother         uterine    High Blood Pressure Mother     Heart Attack Father     Heart Disease Father     Diabetes Father     COPD Father     Colon Polyps Brother        HOME MEDICATIONS:     Prior to Admission medications    Medication Sig Start Date End Date Taking? Authorizing Provider   metFORMIN (GLUCOPHAGE) 1000 MG tablet Take 1,000 mg by mouth 2 times daily (with meals)    Historical Provider, MD   sitaGLIPtin (JANUVIA) 100 MG tablet Take 100 mg by mouth daily    Historical Provider, MD   Omeprazole (PRILOSEC PO) Take 40 mg by mouth daily    Historical Provider, MD   amLODIPine-benazepril (LOTREL) 5-10 MG per capsule Take 1 capsule by mouth daily    Historical Provider, MD   metoprolol (LOPRESSOR) 50 MG tablet Take 50 mg by mouth 2 times daily    Historical Provider, MD       ALLERGIES:      Patient has no known allergies. SOCIAL HISTORY:      reports that she quit smoking about 54 years ago. Her smoking use included cigarettes. She has never used smokeless tobacco. She reports current alcohol use of about 1.0 standard drink per week. She reports that she does not use drugs. TOBACCO:   reports that she quit smoking about 54 years ago. Her smoking use included cigarettes. She has never used smokeless tobacco.  ETOH:   reports current alcohol use of about 1.0 standard drink per week. REVIEW OF SYSTEMS:     CONSTITUTIONAL:  no fevers  EYES: negative for double vision  HEENT: No headaches, no rhinorrhea, no nasal congestion, no sore throat, no difficulty swallowing  RESPIRATORY:negative for dyspnea, no wheezing. CARDIOVASCULAR: negative for chest pain, no palpitations, no fatigue, no edema   GASTROINTESTINAL: no nausea, no vomiting, no change in bowel habits, epigastric pain.   GENITOURINARY: negative for frequency, no dysuria, no nocturia   INTEGUMENT: negative for rash, no easy bruising   HEMATOLOGIC/LYMPHATIC: negative for swelling/edema   ALLERGIC/IMMUNOLOGIC: negative for urticaria   ENDOCRINE: no polydipsia, no polyuria, no hot or cold intolerance  MUSCULOSKELETAL: no joint pains, no muscle aches, no swelling of joints or extremities, generalized weakness. NEUROLOGICAL: no numbness, no tingling  BEHAVIOR/PSYCH: negative      PHYSICAL EXAM:     Vitals:    03/03/23 1527 03/03/23 1739 03/03/23 1758 03/03/23 1900   BP: (!) 105/35 (!) 105/47 (!) 110/56 (!) 98/52   Pulse: 79 80 79 71   Resp: 16 13 16 18   Temp:   99 °F (37.2 °C) 99.7 °F (37.6 °C)   TempSrc:   Temporal Temporal   SpO2: 95% 94%  97%   Weight:   100 lb 12 oz (45.7 kg)    Height:   4' 11.5\" (1.511 m)        No intake or output data in the 24 hours ending 03/03/23 2045    General Appearance  Alert , awake , oriented x 3, not in acute distress  HEENT - Head is normocephalic, atraumatic. Eye - no icterus no redness, EOMI  Ear- No ear pain noted, normal external ear and no discharge  Lungs - Bilateral equal air entry , no wheezes, rales or rhonchi, aeration good  Cardiovascular - Heart sounds are normal.  Regular rhythm, normal rate without murmur, gallop or rub. Abdomen - Soft, nontender, nondistended, no masses or organomegaly  Neurologic - There are no new focal motor or sensory deficits, muscle strength 5/5 in all extremities, normal muscle tone and bulk, no abnormal sensation. Skin - No bruising or bleeding on exposed skin area, right lateral thigh dressing is c/d/I.   Extremities - No cyanosis, clubbing or edema  Psych - normal affect        DIAGNOSTICS:      Laboratory Testing:    See [employer] EMR for Lab data    Recent Results (from the past 24 hour(s))   Basic Metabolic Panel    Collection Time: 03/03/23  6:38 AM   Result Value Ref Range    Glucose 169 (H) 70 - 99 mg/dL    BUN 18 8 - 23 mg/dL    Creatinine 0.49 (L) 0.50 - 0.90 mg/dL    Est, Glom Filt Rate >60 >60 mL/min/1.73m2    Bun/Cre Ratio 37 (H) 9 - 20    Calcium 8.6 8.6 - 10.4 mg/dL    Sodium 127 (L) 135 - 144 mmol/L    Potassium 3.8 3.7 - 5.3 mmol/L    Chloride 87 (L) 98 - 107 mmol/L    CO2 33 (H) 20 - 31 mmol/L    Anion Gap 7 (L) 9 - 17 mmol/L   EKG 12 Lead    Collection Time: 03/03/23  2:55 PM   Result Value Ref Range    Ventricular Rate 76 BPM    Atrial Rate 76 BPM    P-R Interval 154 ms    QRS Duration 86 ms    Q-T Interval 422 ms    QTc Calculation (Bazett) 474 ms    P Axis 62 degrees    R Axis 3 degrees    T Axis 47 degrees   CBC with Auto Differential    Collection Time: 03/03/23  3:18 PM   Result Value Ref Range    WBC 10.6 3.5 - 11.3 k/uL    RBC 3.17 (L) 3.95 - 5.11 m/uL    Hemoglobin 9.9 (L) 11.9 - 15.1 g/dL    Hematocrit 29.6 (L) 36.3 - 47.1 %    MCV 93.4 82.6 - 102.9 fL    MCH 31.2 25.2 - 33.5 pg    MCHC 33.4 28.4 - 34.8 g/dL    RDW 14.6 (H) 11.8 - 14.4 %    Platelets 946 006 - 756 k/uL    MPV 10.6 8.1 - 13.5 fL    NRBC Automated 0.0 0.0 per 100 WBC    Seg Neutrophils 82 (H) 36 - 65 %    Lymphocytes 7 (L) 24 - 43 %    Monocytes 10 3 - 12 %    Eosinophils % 0 (L) 1 - 4 %    Basophils 0 0 - 2 %    Immature Granulocytes 1 (H) 0 %    Segs Absolute 8.75 (H) 1.50 - 8.10 k/uL    Absolute Lymph # 0.74 (L) 1.10 - 3.70 k/uL    Absolute Mono # 1.01 0.10 - 1.20 k/uL    Absolute Eos # 0.03 0.00 - 0.44 k/uL    Basophils Absolute <0.03 0.00 - 0.20 k/uL    Absolute Immature Granulocyte 0.08 0.00 - 0.30 k/uL   Comprehensive Metabolic Panel    Collection Time: 03/03/23  3:18 PM   Result Value Ref Range    Glucose 182 (H) 70 - 99 mg/dL    BUN 17 8 - 23 mg/dL    Creatinine 0.59 0.50 - 0.90 mg/dL    Est, Glom Filt Rate >60 >60 mL/min/1.73m2    Bun/Cre Ratio 29 (H) 9 - 20    Calcium 8.1 (L) 8.6 - 10.4 mg/dL    Sodium 130 (L) 135 - 144 mmol/L    Potassium 3.8 3.7 - 5.3 mmol/L    Chloride 91 (L) 98 - 107 mmol/L    CO2 31 20 - 31 mmol/L    Anion Gap 8 (L) 9 - 17 mmol/L    Alkaline Phosphatase 82 35 - 104 U/L    ALT 17 5 - 33 U/L    AST 15 <32 U/L    Total Bilirubin 0.8 0.3 - 1.2 mg/dL    Total Protein 5.6 (L) 6.4 - 8.3 g/dL Albumin 3.1 (L) 3.5 - 5.2 g/dL    Albumin/Globulin Ratio 1.2 1.0 - 2.5   Troponin    Collection Time: 03/03/23  3:18 PM   Result Value Ref Range    Troponin, High Sensitivity 48 (H) 0 - 14 ng/L       Complete Blood Count:   Recent Labs     03/03/23  1518   WBC 10.6   RBC 3.17*   HGB 9.9*   HCT 29.6*   MCV 93.4   RDW 14.6*         Recent Labs     03/03/23  1518   SEGS 82*   NEUTROABS 8.75*   LYMPHOPCT 7*   LYMPHSABS 0.74*   MONOPCT 10   EOSRELPCT 0*   BASOPCT 0   IMMGRAN 1*       Recent Blood Glucose:   Recent Labs     03/03/23  0638 03/03/23  1518   GLUCOSE 169* 182*        Comprehensive Metabolic Profile:   Recent Labs     03/03/23  0638 03/03/23  1518   BUN 18 17   CREATININE 0.49* 0.59   * 130*   K 3.8 3.8   CL 87* 91*   CALCIUM 8.6 8.1*   ANIONGAP 7* 8*   CO2 33* 31   PROT  --  5.6*   LABALBU  --  3.1*   BILITOT  --  0.8   ALKPHOS  --  82   AST  --  15   ALT  --  17        UA:   Lab Results   Component Value Date    COLORU YELLOW 02/02/2021    CLARITYU Clear 12/23/2020    SPECGRAV 1.025 (H) 02/02/2021    WBCUA 0 TO 2 02/02/2021    RBCUA None 02/02/2021    EPITHUA 0 TO 2 02/02/2021    LEUKOCYTESUR NEGATIVE 02/02/2021    NITRU NEGATIVE 02/02/2021    GLUCOSEU NEGATIVE 02/02/2021    BLOODU Moderate (A) 12/23/2020    KETUA NEGATIVE 02/02/2021    PROTEINU NEGATIVE 02/02/2021    HGBUR NEGATIVE 02/02/2021    CASTUA NOT REPORTED 02/02/2021    CRYSTUA 2 TO 5 CALCIUM OXALATE (A) 02/02/2021    BACTERIA NOT REPORTED 02/02/2021    YEAST NOT REPORTED 02/02/2021       Lactic Acid:   Lab Results   Component Value Date/Time    LACTA 3.3 12/23/2020 12:44 PM    LACTA 1.5 12/15/2020 08:53 PM        D-Dimer:  Lab Results   Component Value Date    DDIMER <100.00 12/17/2020       PT/INR:  Lab Results   Component Value Date/Time    PROTIME 13.5 02/15/2023 10:43 AM    INR 1.01 02/15/2023 10:43 AM       High Sensitivity Troponin:  Recent Labs     03/03/23  1518   TROPHS 48*       Pro-BNP:  No results found for: PROBNP    ABGs:   No results found for: PHART, PH, SSP4AIV, PCO2, PO2ART, PO2, RRO7RHR, HCO3, BEART, BE, THGBART, THB, FNH0SOV, R1OFPHPO, O2SAT, FIO2      Imaging/Diagonstics:  XR FEMUR RIGHT (MIN 2 VIEWS)    Result Date: 2/17/2023  XR FEMUR 2+ VW RIGHT CLINICAL STATEMENT: 81years old Female with right femur fracture with new internal fixation. COMPARISON: Pelvis and right femur radiographs dated 02/15/2023. TECHNIQUE: 4 views obtained. FINDINGS: New right intramedullary femoral nail. There is a comminuted right proximal femoral fracture with multiple fracture fragments with improved alignment. Knee joint is in adequate anatomic alignment. Postsurgical soft tissue changes with subcutaneous gas and surgical staples overlying the right lateral pelvis, lateral proximal and distal femur. IMPRESSION: * New right intramedullary femoral nail with slightly improved alignment of the fracture fragments. *Postsurgical soft tissue changes. Approved by:Wisam De Diosn2/17/2023 2:47 AM. Len Franklin reviewed the image(s) and agree with the findings in this report. Electronically signed: Yo Palma. CT ABDOMEN PELVIS W IV CONTRAST Additional Contrast? None    Result Date: 3/3/2023  EXAMINATION: CT OF THE ABDOMEN AND PELVIS WITH CONTRAST 3/3/2023 1:39 pm TECHNIQUE: CT of the abdomen and pelvis was performed with the administration of intravenous contrast. Multiplanar reformatted images are provided for review. Automated exposure control, iterative reconstruction, and/or weight based adjustment of the mA/kV was utilized to reduce the radiation dose to as low as reasonably achievable. COMPARISON: 01/24/2020.  HISTORY: ORDERING SYSTEM PROVIDED HISTORY: abd pain recent surgery concern for sbo TECHNOLOGIST PROVIDED HISTORY: abd pain recent surgery concern for sbo Decision Support Exception - unselect if not a suspected or confirmed emergency medical condition->Emergency Medical Condition (MA) FINDINGS: Lower Chest: The lung bases are clear. There is no pleural effusion. Organs: Small low-attenuation liver lesions are stable and believed represent cysts. There has been a cholecystectomy. The spleen, adrenal glands and pancreas appear normal.  The kidneys enhance normally. There is no hydronephrosis or ureteral stone. GI/Bowel: There is no bowel dilatation or bowel wall thickening. The appendix appears normal.  A small hiatal hernia is smaller than before. There is fluid in the esophagus. Pelvis: The bladder appears normal.  There is no abnormal adnexal mass. Peritoneum/Retroperitoneum: No evidence of lymphadenopathy. Aorta is normal in caliber. No fat stranding, free fluid, free air or focal fluid collection is identified. Bones/Soft Tissues: No destructive bone lesion is identified. 1. No acute findings in the abdomen or pelvis. 2. Decreased size of a now small hiatal hernia. There is fluid in the esophagus which could reflect esophageal dysmotility or gastroesophageal reflux. 3. Stable small low-attenuation liver lesions favored to represent cysts. XR CHEST PORTABLE    Result Date: 2/15/2023  EXAM: XR CHEST PORTABLE HISTORY: pre op prep COMPARISON: 12/23/2020 TECHNIQUE: AP view of the chest. FINDINGS: The cardiomediastinal silhouette is normal. The lungs are clear. There is no pneumothorax. No pleural effusion is noted. The osseous structures are intact. Report electronically signed by: Dr. Aranza Leonardo    No acute cardiopulmonary process. FL in Surgery Less Than One Hour    Result Date: 2/17/2023  IM NAIL RT FEMUR History:  Fluoroscopic guided procedure. Findings:Reference Air Kerma:  24.79 mGy  seconds . A radiologist was not present. Questions regarding this exam should be directed to the clinical service that performed the procedure. This dictation was generated for documentation purposes for the fluoroscopic equipment utilization. IMPRESSION: Impression: As above Electronically signed: Willdann Hem.     XR HIP 2-3 VW W PELVIS RIGHT    Result Date: 2/15/2023  EXAM: XR HIP 2-3 VW W PELVIS RIGHT HISTORY: [facility] NOTES: Fall , right leg laying in external rotation fall right hip pain COMPARISON: None. TECHNIQUE: Single view of the pelvis and 2 views of the right hip FINDINGS: There is an acute, comminuted spiral appearing fracture involving the subtrochanteric region of the right femur. The fracture does extend into the lesser trochanter. The femur is well seated within the acetabulum. No other acute fracture identified. Moderate bilateral hip and sacroiliac osteoarthritis. Moderate degenerative disc disease the visualized lower lumbar spine. Report electronically signed by: Dr. Zahida Vences    Comminuted, moderately displaced subtrochanteric right proximal femur fracture. ASSESSMENT:       Principal Problem:    Hyponatremia  Active Problems:    Dysphagia    Nausea    Hiatal hernia  Resolved Problems:    * No resolved hospital problems. *      PLAN:     Patient status: Admit the patient    MEDICAL DECISION MAKING:    Primary Problem(s): Hyponatremia  Condition is a chronic illness with exacerbation, progression or side effects of treatment  Condition is stable  Treatment plan: Continue current treatment  Imaging: no further imaging studies ordered today  Medications: Continue current home medications  Medication Monitoring / High Risk Medications: none   Encourage PO intake  IVF fro now  Held BP meds for now given hypotension  Hold metformin given GI c/o  Zofran Prn for nausea  Cont lipitor  Obtain labs for monitoring, incl.  B12/folate/vitD  Obtain urine /lytes, Serum Osm  PT/OT     Nutrition status:   at risk for malnutrition  Dietician consult initiated    Hospital Prophylaxis:   DVT: Lovenox   Stress Ulcer: PPI     MDM Data:   My independent X-ray interpretation:  not applicable  Management and/or test interpretation discussed with ER MD at time of admission  Consults and Nursing notes were personally reviewed, all current labs and imaging were personally reviewed, tests ordered: CBC, BMP, and history obtained by independent historian     [facility] Medication Reconciliation documentation:    [x] I have utilized all available immediate resources to obtain, update, or review the patient's current medications (including all prescriptions, over-the-counter products, herbals, cannabinoid products and vitamin/mineral/dietary/nutritional supplements. Disposition:  Shared decision making: All test results, treatment options and disposition options were discussed with the patient today  Social determinants of health that may impact management: none  Code status: Full Code   Disposition: Discharge plan is pending    Consults: IP CONSULT TO SOCIAL WORK  PT/OT  Vital signs per unit routine  Pulse Oximetry   RT Evaluation & Treat   Daily weights  Fall precautions  Incentive spirometry  Intake and output   Neurovascular checks  Place intermittent pneumatic compression device  Telemetry monitoring    [facility] Advanced Care Planning documentation:  [x] I have confirmed that the patient's Advance Care Plan is present, Code Status is documented, or surrogate decision maker is listed in the patient's medical record    Above plan discussed with the patient who agreed to the above plan     CORE MEASURES  Core measures including DVT prophylaxis, Code Status/Advanced Directives, Nutrition, Therapy Options as well as prior records, imaging, and labs were reviewed at this encounter. Please note that this chart was generated using voice recognition Dragon dictation software. Although every effort was made to ensure the accuracy of this automated transcription, some errors in transcription may have occurred.     Nica Tee MD  3/3/2023 8:45 PM

## 2023-03-04 NOTE — PROGRESS NOTES
[facility]  Inpatient/Observation/Outpatient Rehabilitation    Date: 3/4/2023  Patient Name: Gail Allen       [x] Inpatient Acute/Observation    : 1935       [x] Pt refused/declined therapy at this time due to:        Pt requested PT to try later this AM, to let her stomach settle after eating. Therapist/Assistant will attempt to see this patient, at our earliest opportunity.        Lis Bullard, PT, DPT Date: 3/4/2023

## 2023-03-04 NOTE — PROGRESS NOTES
Reassessment completed at this time. Vitals taken and documented. Patient encouraged to ask questions. Patient denies pain or complaints. Call light and bed side table within reach. Side rails up times two.

## 2023-03-04 NOTE — PROGRESS NOTES
Patient admitted to floor. Family at bedside. Navigator completed. Med req completed. Patient oriented to room and call light system. Patient educated on physician's orders and medication to be given tonight. Patient stated understanding. Patient encouraged to ask questions. Patient denies questions at this time. Vitals taken and documented. See flow sheet for details. Assessment completed and documented. Patient alert, oriented x4. Calm, pleasant. Speech clear. Lung sounds clear in upper bilateral lobes of lungs and right middle lobe of lung. Diminished at bilateral bases of lungs. No cough noted. Abdomen round, soft, non tender to palpation. Patient states last bowel movement was 3/3. Bowel sounds active in all four quadrants. Patient complains of diarrhea over the last week but did not have any today. Patient also noted to be having flatus. No edema noted. Scattered ecchymosis noted. Noted ecchymosis on right hip. Abrasion noted on outer right knee. Dressing clean, dry, intact. Patient denies of pain, chest pain, numbness, tingling, or shortness of breath. Patient denies needs or concerns at this time. Call light and over bed table in reach. Side rails up times two.

## 2023-03-05 LAB
ABSOLUTE EOS #: 0.09 K/UL (ref 0–0.44)
ABSOLUTE IMMATURE GRANULOCYTE: <0.03 K/UL (ref 0–0.3)
ABSOLUTE LYMPH #: 1.03 K/UL (ref 1.1–3.7)
ABSOLUTE MONO #: 0.43 K/UL (ref 0.1–1.2)
ALBUMIN SERPL-MCNC: 2.7 G/DL (ref 3.5–5.2)
ALBUMIN/GLOBULIN RATIO: 1.2 (ref 1–2.5)
ALP SERPL-CCNC: 75 U/L (ref 35–104)
ALT SERPL-CCNC: 12 U/L (ref 5–33)
ANION GAP SERPL CALCULATED.3IONS-SCNC: 5 MMOL/L (ref 9–17)
AST SERPL-CCNC: 12 U/L
BASOPHILS # BLD: 1 % (ref 0–2)
BASOPHILS ABSOLUTE: 0.03 K/UL (ref 0–0.2)
BILIRUB SERPL-MCNC: 0.4 MG/DL (ref 0.3–1.2)
BUN SERPL-MCNC: 9 MG/DL (ref 8–23)
BUN/CREAT BLD: 18 (ref 9–20)
CALCIUM SERPL-MCNC: 8.1 MG/DL (ref 8.6–10.4)
CHLORIDE SERPL-SCNC: 100 MMOL/L (ref 98–107)
CO2 SERPL-SCNC: 29 MMOL/L (ref 20–31)
CREAT SERPL-MCNC: 0.51 MG/DL (ref 0.5–0.9)
EOSINOPHILS RELATIVE PERCENT: 2 % (ref 1–4)
GFR SERPL CREATININE-BSD FRML MDRD: >60 ML/MIN/1.73M2
GLUCOSE SERPL-MCNC: 180 MG/DL (ref 70–99)
HCT VFR BLD AUTO: 26.6 % (ref 36.3–47.1)
HGB BLD-MCNC: 8.6 G/DL (ref 11.9–15.1)
IMMATURE GRANULOCYTES: 1 %
LYMPHOCYTES # BLD: 23 % (ref 24–43)
MCH RBC QN AUTO: 30.6 PG (ref 25.2–33.5)
MCHC RBC AUTO-ENTMCNC: 32.3 G/DL (ref 28.4–34.8)
MCV RBC AUTO: 94.7 FL (ref 82.6–102.9)
MICROORGANISM SPEC CULT: ABNORMAL
MICROORGANISM SPEC CULT: ABNORMAL
MONOCYTES # BLD: 10 % (ref 3–12)
NRBC AUTOMATED: 0 PER 100 WBC
PDW BLD-RTO: 14.6 % (ref 11.8–14.4)
PLATELET # BLD AUTO: 223 K/UL (ref 138–453)
PMV BLD AUTO: 10.6 FL (ref 8.1–13.5)
POTASSIUM SERPL-SCNC: 4.2 MMOL/L (ref 3.7–5.3)
PROT SERPL-MCNC: 5 G/DL (ref 6.4–8.3)
RBC # BLD: 2.81 M/UL (ref 3.95–5.11)
SEG NEUTROPHILS: 63 % (ref 36–65)
SEGMENTED NEUTROPHILS ABSOLUTE COUNT: 2.84 K/UL (ref 1.5–8.1)
SODIUM SERPL-SCNC: 134 MMOL/L (ref 135–144)
SPECIMEN DESCRIPTION: ABNORMAL
WBC # BLD AUTO: 4.4 K/UL (ref 3.5–11.3)

## 2023-03-05 PROCEDURE — 6360000002 HC RX W HCPCS: Performed by: STUDENT IN AN ORGANIZED HEALTH CARE EDUCATION/TRAINING PROGRAM

## 2023-03-05 PROCEDURE — 97530 THERAPEUTIC ACTIVITIES: CPT

## 2023-03-05 PROCEDURE — 2580000003 HC RX 258: Performed by: STUDENT IN AN ORGANIZED HEALTH CARE EDUCATION/TRAINING PROGRAM

## 2023-03-05 PROCEDURE — 97535 SELF CARE MNGMENT TRAINING: CPT

## 2023-03-05 PROCEDURE — 6370000000 HC RX 637 (ALT 250 FOR IP): Performed by: STUDENT IN AN ORGANIZED HEALTH CARE EDUCATION/TRAINING PROGRAM

## 2023-03-05 PROCEDURE — 1200000000 HC SEMI PRIVATE

## 2023-03-05 PROCEDURE — 36415 COLL VENOUS BLD VENIPUNCTURE: CPT

## 2023-03-05 PROCEDURE — 85025 COMPLETE CBC W/AUTO DIFF WBC: CPT

## 2023-03-05 PROCEDURE — 94761 N-INVAS EAR/PLS OXIMETRY MLT: CPT

## 2023-03-05 PROCEDURE — 80053 COMPREHEN METABOLIC PANEL: CPT

## 2023-03-05 RX ORDER — SUCRALFATE 1 G/1
1 TABLET ORAL EVERY 6 HOURS SCHEDULED
Status: DISCONTINUED | OUTPATIENT
Start: 2023-03-05 | End: 2023-03-06 | Stop reason: HOSPADM

## 2023-03-05 RX ORDER — SERTRALINE HYDROCHLORIDE 25 MG/1
50 TABLET, FILM COATED ORAL NIGHTLY
Status: DISCONTINUED | OUTPATIENT
Start: 2023-03-05 | End: 2023-03-06 | Stop reason: HOSPADM

## 2023-03-05 RX ORDER — DICYCLOMINE HYDROCHLORIDE 10 MG/1
10 CAPSULE ORAL
Status: DISCONTINUED | OUTPATIENT
Start: 2023-03-05 | End: 2023-03-06 | Stop reason: HOSPADM

## 2023-03-05 RX ORDER — PROMETHAZINE HYDROCHLORIDE 25 MG/1
12.5 TABLET ORAL EVERY 6 HOURS PRN
Status: DISCONTINUED | OUTPATIENT
Start: 2023-03-05 | End: 2023-03-06 | Stop reason: HOSPADM

## 2023-03-05 RX ORDER — FAMOTIDINE 20 MG/1
20 TABLET, FILM COATED ORAL DAILY
Status: DISCONTINUED | OUTPATIENT
Start: 2023-03-05 | End: 2023-03-06 | Stop reason: HOSPADM

## 2023-03-05 RX ADMIN — METOPROLOL TARTRATE 50 MG: 50 TABLET, FILM COATED ORAL at 08:14

## 2023-03-05 RX ADMIN — SUCRALFATE 1 G: 1 TABLET ORAL at 17:12

## 2023-03-05 RX ADMIN — METFORMIN HYDROCHLORIDE 1000 MG: 500 TABLET ORAL at 08:14

## 2023-03-05 RX ADMIN — ONDANSETRON 4 MG: 2 INJECTION INTRAMUSCULAR; INTRAVENOUS at 14:08

## 2023-03-05 RX ADMIN — FAMOTIDINE 20 MG: 20 TABLET ORAL at 17:13

## 2023-03-05 RX ADMIN — METOPROLOL TARTRATE 50 MG: 50 TABLET, FILM COATED ORAL at 21:49

## 2023-03-05 RX ADMIN — DICYCLOMINE HYDROCHLORIDE 10 MG: 10 CAPSULE ORAL at 21:49

## 2023-03-05 RX ADMIN — CEFTRIAXONE 1000 MG: 1 INJECTION, POWDER, FOR SOLUTION INTRAMUSCULAR; INTRAVENOUS at 08:11

## 2023-03-05 RX ADMIN — Medication 2000 UNITS: at 08:14

## 2023-03-05 RX ADMIN — SERTRALINE HYDROCHLORIDE 50 MG: 25 TABLET ORAL at 21:49

## 2023-03-05 RX ADMIN — PANTOPRAZOLE SODIUM 40 MG: 40 TABLET, DELAYED RELEASE ORAL at 08:14

## 2023-03-05 RX ADMIN — PROMETHAZINE HYDROCHLORIDE 12.5 MG: 25 TABLET ORAL at 17:12

## 2023-03-05 RX ADMIN — ENOXAPARIN SODIUM 30 MG: 100 INJECTION SUBCUTANEOUS at 08:14

## 2023-03-05 RX ADMIN — ATORVASTATIN CALCIUM 10 MG: 10 TABLET, FILM COATED ORAL at 08:14

## 2023-03-05 ASSESSMENT — PAIN SCALES - GENERAL: PAINLEVEL_OUTOF10: 0

## 2023-03-05 NOTE — PROGRESS NOTES
Pt noted to have emesis unmeasured d/t tissues in basin. Was some undigested food. Remains nauseated at this time. Encouraged fluids but unable to tolerate. Call light in reach, will continue to monitor.

## 2023-03-05 NOTE — PROGRESS NOTES
Physical Therapy  Facility/Department: [facility] AT THE [facility] MED SURG  Daily Treatment Note  NAME: Maria D Johnson  : 1935  MRN: [id number]    Date of Service: 3/5/2023    Discharge Recommendations:  Subacute/Skilled Nursing Facility, Continue to assess pending progress        Patient Diagnosis(es): The primary encounter diagnosis was Elevated troponin. Diagnoses of General weakness and Hyponatremia were also pertinent to this visit. Assessment   Assessment: Pt participated well today, quick fatigue and required extra time for task. Activity Tolerance: Patient limited by fatigue;Patient limited by endurance     Plan    Physcial Therapy Plan  General Plan: 2 times a day 7 days a week (1x daily on weekends)  Current Treatment Recommendations: Strengthening;ROM;Balance training;Functional mobility training;Transfer training;ADL/Self-care training;IADL training;Stair training;Gait training; Endurance training;Neuromuscular re-education;Manual;Pain management;Home exercise program;Safety education & training;Patient/Caregiver education & training;Modalities; Therapeutic activities     Restrictions  Restrictions/Precautions  Restrictions/Precautions: General Precautions, Fall Risk, Weight Bearing, Contact Precautions  Lower Extremity Weight Bearing Restrictions  Right Lower Extremity Weight Bearing: Weight Bearing As Tolerated     Subjective    Subjective  Subjective: Pt in bed upon entry, pleasant and agreeable to get into recliner for breakfast  Pain: Pt denies  Orientation  Overall Orientation Status: Within Functional Limits     Objective   Vitals     Bed Mobility Training  Bed Mobility Training: Yes  Overall Level of Assistance: Minimum assistance;Assist X1  Rolling: Minimum assistance;Assist X1  Supine to Sit: Minimum assistance;Assist X1  Scooting: Minimum assistance;Assist X1  Transfer Training  Transfer Training: Yes  Overall Level of Assistance: Minimum assistance;Assist X2  Interventions: Verbal cues  Sit to Stand: Minimum assistance;Assist X2  Stand to Sit: Minimum assistance;Assist X2  Gait Training  Gait Training: Yes  Gait  Overall Level of Assistance: Minimum assistance;Assist X2  Interventions: Verbal cues; Safety awareness training  Speed/Peyton: Slow  Gait Abnormalities: Shuffling gait  Distance (ft):  (Patient completed ~ 5 steps from bed>recliner)  Assistive Device: Walker, rolling           Safety Devices  Type of Devices: Chair alarm in place;Call light within reach;Gait belt;Nurse notified; Left in chair       Goals  Short Term Goals  Time Frame for Short Term Goals: 10 days  Short Term Goal 1: Patient will ambulate 8' with FWW, CGA, without LOB  Short Term Goal 2: Patient will perform transfers and bed mobility with CGA  Short Term Goal 3: Patient will tolerate 20-30 minutes of therex/act to improve endurance for ADLs.   Patient Goals   Patient Goals : Improve mobility    Education  Patient Education  Education Given To: Patient  Education Provided: Role of Therapy;Plan of Care;Transfer Training  Education Method: Verbal  Barriers to Learning: None  Education Outcome: Verbalized understanding    Therapy Time   Individual Concurrent Group Co-treatment   Time In 0700         Time Out 0723         Minutes [facility]

## 2023-03-05 NOTE — PROGRESS NOTES
Writer at bedside for shift assessment. Patient is alert and oriented x4, calm and cooperative with assessment. Patient denies pain or discomfort at this time. Lung field diminished bilaterally with no dyspnea or increased work of breath noted. Bowel sounds are active x4- soft and nontender to touch. Patient is free of incontinence at this time and denies needs for restroom or bedside commode. Denying any additional needs at this time, call light placed in reach, bed in lowest position and alarm engaged to promote patient safety. Writer encourages patient to call out for assistance. Writer continues to monitor.

## 2023-03-05 NOTE — PROGRESS NOTES
Pts family asked about other medications to help her with nausea and bile/acid. Sent Dr. Mei Mckay a message. Awaiting orders.

## 2023-03-05 NOTE — PLAN OF CARE
[facility] CRITICAL CARE OUTREACH NURSE PROGRESS REPORT      SUBJECTIVE: Called to assess patient secondary to transfer from ICU. MEWS Score: 1 (03/14/19 1055)  Vitals:    03/14/19 1509 03/14/19 1526 03/14/19 1649 03/14/19 2000   BP: 145/67  149/60 164/63   Pulse: 87  (!) 59 (!) 59   Resp: (!) 37  22 22   Temp:  98.4 °F (36.9 °C) 98.2 °F (36.8 °C) 98.4 °F (36.9 °C)   SpO2: 95%  93% 96%   Weight:       Height:          LAB DATA:    Recent Labs     03/14/19  0327 03/13/19  1253    141   K 3.6 3.6   * 108*   CO2 21 20*   AGAP 9 13   * 137*   BUN 19 18   CREA 0.83 1.00   GFRAA >60 >60   GFRNA >60 55*   CA 8.4 9.3   ALB  --  3.6   TP  --  7.6   GLOB  --  4.0*   AGRAT  --  0.9*   ALT  --  28        Recent Labs     03/14/19 0327 03/13/19  1253   WBC 6.7 6.2   HGB 11.8 12.7   HCT 36.2 38.0    189          OBJECTIVE: On arrival to room, I found patient to be resting in bed, awake. Pain Assessment  Pain Intensity 1: 0 (03/14/19 1526)  Pain Location 1: Head  Pain Intervention(s) 1: Relaxation technique, Repositioned, MD notified (comment)  Patient Stated Pain Goal: 0    ASSESSMENT:  Patient is alert and oriented x4, resting in bed, watching tv. O2 sat 94% on RA, HR 64. Denies needs at this time. Bed alarm on. PLAN:  Will continue to follow per outreach protocol. Problem: Discharge Planning  Goal: Discharge to home or other facility with appropriate resources  3/4/2023 2316 by Zhang Ortez RN  Outcome: Progressing  Flowsheets (Taken 3/4/2023 2316)  Discharge to home or other facility with appropriate resources:   Identify barriers to discharge with patient and caregiver   Arrange for needed discharge resources and transportation as appropriate   Identify discharge learning needs (meds, wound care, etc)   Arrange for interpreters to assist at discharge as needed   Refer to discharge planning if patient needs post-hospital services based on physician order or complex needs related to functional status, cognitive ability or social support system  3/4/2023 1005 by Delon Gupta RN  Outcome: Progressing     Problem: Safety - Adult  Goal: Free from fall injury  3/4/2023 2316 by Zhang Ortez RN  Outcome: Progressing  [address] (Taken 3/4/2023 2316)  Free From Fall Injury:   Instruct family/caregiver on patient safety   Based on caregiver fall risk screen, instruct family/caregiver to ask for assistance with transferring infant if caregiver noted to have fall risk factors  3/4/2023 1005 by Delon Gupta RN  Outcome: Progressing     Problem: ABCDS Injury Assessment  Goal: Absence of physical injury  3/4/2023 2316 by Zhang Ortez RN  Outcome: Progressing  Flowsheets (Taken 3/4/2023 2316)  Absence of Physical Injury: Implement safety measures based on patient assessment  Note: Needs assessed hourly, pt alert and oriented able to express needs or meet needs independently. 3/4/2023 1005 by Delon Gupta RN  Outcome: Progressing     Problem: Skin/Tissue Integrity  Goal: Absence of new skin breakdown  Description: 1. Monitor for areas of redness and/or skin breakdown  2. Assess vascular access sites hourly  3. Every 4-6 hours minimum:  Change oxygen saturation probe site  4.   Every 4-6 hours:  If on nasal continuous positive airway pressure, respiratory therapy assess nares and determine need for appliance change or resting period. 3/4/2023 2316 by Prieto Tanner RN  Outcome: Progressing  Note: David scale monitoring per protocol. Inspect skin for breakdown frequently. Encourage pt to make frequent large adjustments in position or assist patient with turning. Document all areas of breakdown.      3/4/2023 1005 by Delmer Grossman RN  Outcome: Progressing     Problem: Pain  Goal: Verbalizes/displays adequate comfort level or baseline comfort level  3/4/2023 2316 by Prieto Tanner RN  Outcome: Progressing  Flowsheets (Taken 3/4/2023 2316)  Verbalizes/displays adequate comfort level or baseline comfort level:   Encourage patient to monitor pain and request assistance   Assess pain using appropriate pain scale   Administer analgesics based on type and severity of pain and evaluate response   Implement non-pharmacological measures as appropriate and evaluate response   Consider cultural and social influences on pain and pain management   Notify Licensed Independent Practitioner if interventions unsuccessful or patient reports new pain  3/4/2023 1005 by Delmer Grossman RN  Outcome: Progressing

## 2023-03-05 NOTE — PROGRESS NOTES
Writer at bedside for reassessment- see flow sheet for details. Pt remains alert and oriented x4-calm and cooperative. Continues to deny pain or discomfort. Brief is clean, dry and intact and pt denies needs for restroom. Call light in reach, bed in lowest position and alarm engaged to promote patient safety. Writer continues to monitor.

## 2023-03-05 NOTE — PROGRESS NOTES
Pt called out for a basin, c/o nausea. PRN zofran IV given and basin provided. No emesis at this time, just retching. Call light in reach. Will continue to monitor.

## 2023-03-05 NOTE — PROGRESS NOTES
[address], [address]    Progress Note    Date:   3/5/2023  Patient name:  Rene Jang  Date of admission:  3/3/2023  2:51 PM  MRN:   [id number]  YOB: 1935    SUBJECTIVE/Last 24 hours update:     Patient seen and examined at the bed side , no new acute events overnight and no new complains this morning. She is tolerating PO intake well. Notes from nursing staff and Consults had been reviewed, and the overnight progress had been checked with the nursing staff as well. REVIEW OF SYSTEMS:      CONSTITUTIONAL:  no fevers, no headcahes  EYES: negative for blury vision  HEENT: No headaches, No nasal congestion, no difficulty swallowing  RESPIRATORY:negative for dyspnea, no wheezing, no Cough  CARDIOVASCULAR: negative for chest pain, no palpitations  GASTROINTESTINAL: no nausea, no vomiting, no change in bowel habits,  minimal abdominal discomfort   GENITOURINARY: negative for dysuria, no hematuria   MUSCULOSKELETAL: no joint pains, no muscle aches, no swelling of joints or extremities  NEUROLOGICAL: minimal Weakness and no numbness      PAST MEDICAL HISTORY:      has a past medical history of Arthritis, Diabetes mellitus ([address] Dysphagia, GERD (gastroesophageal reflux disease), HTN (hypertension), Hyperlipidemia, Hypertension, and Type 2 diabetes mellitus ([address] PAST SURGICAL HISTORY:      has a past surgical history that includes Total knee arthroplasty (Left); Rotator cuff repair (Right); Partial hysterectomy; Cholecystectomy, laparoscopic; Colonoscopy (2008); Colonoscopy (01/26/2016); Upper gastrointestinal endoscopy (01/26/2016); Cardiac catheterization (2007?); Colonoscopy (11/20/2019); Colonoscopy (N/A, 11/20/2019); Upper gastrointestinal endoscopy (01/20/2020); Upper gastrointestinal endoscopy (N/A, 01/20/2020); fracture surgery (Right, 02/16/2023); and hernia repair (09/2020).        SOCIAL HISTORY:      reports that she quit smoking about 54 years ago. Her smoking use included cigarettes. She has never used smokeless tobacco. She reports current alcohol use of about 1.0 standard drink per week. She reports that she does not use drugs. TOBACCO:   reports that she quit smoking about 54 years ago. Her smoking use included cigarettes. She has never used smokeless tobacco.  ETOH:   reports current alcohol use of about 1.0 standard drink per week. FAMILY HISTORY:     family history includes Arthritis in her mother; COPD in her father; Cancer in her mother; Colon Polyps in her brother; Diabetes in her father and mother; Heart Attack in her father; Heart Disease in her father; High Blood Pressure in her mother. Problem Relation Age of Onset    Arthritis Mother     Diabetes Mother     Cancer Mother         uterine    High Blood Pressure Mother     Heart Attack Father     Heart Disease Father     Diabetes Father     COPD Father     Colon Polyps Brother        HOME MEDICATIONS:      Prior to Admission medications    Medication Sig Start Date End Date Taking?  Authorizing Provider   acetaminophen (TYLENOL) 650 MG suppository Place 650 mg rectally every 4 hours as needed for Fever or Pain   Yes Historical Provider, MD   atorvastatin (LIPITOR) 10 MG tablet Take 10 mg by mouth at bedtime   Yes Historical Provider, MD   bisacodyl (DULCOLAX) 10 MG suppository Place 10 mg rectally daily as needed for Constipation (as needed for bowel protocol)   Yes Historical Provider, MD   calcium carbonate (TUMS EX) 750 MG chewable tablet Take 1 tablet by mouth every 6 hours as needed (as needed for GI upset)   Yes Historical Provider, MD   dicyclomine (BENTYL) 10 MG capsule Take 10 mg by mouth 4 times daily (before meals and nightly)   Yes Historical Provider, MD   enoxaparin (LOVENOX) 40 MG/0.4ML Inject into the skin daily   Yes Historical Provider, MD   famotidine (PEPCID) 20 MG tablet Take 20 mg by mouth 2 times daily   Yes Historical Provider, MD   sodium phosphate (FLEET) 7-19 GM/118ML Place 1 enema rectally once as needed (As needed for bowel protocl)   Yes Historical Provider, MD   loperamide (IMODIUM) 2 MG capsule Take 2 mg by mouth 4 times daily as needed for Diarrhea 4mg PO initial dose and 2mg PRN after each unformed stool. Max dose of 16mg per 24 hour period   Yes Historical Provider, MD   potassium chloride (MICRO-K) 10 MEQ extended release capsule Take 10 mEq by mouth daily   Yes Historical Provider, MD   magnesium hydroxide (MILK OF MAGNESIA) 400 MG/5ML suspension Take 5 mLs by mouth daily as needed for Constipation (as needed for bowel protocol)   Yes Historical Provider, MD   aluminum & magnesium hydroxide-simethicone (MAALOX) 200-200-20 MG/5ML SUSP suspension Take 5 mLs by mouth every 6 hours as needed for Indigestion (for GI upset)   Yes Historical Provider, MD   nitroGLYCERIN (NITROSTAT) 0.4 MG SL tablet Place 0.4 mg under the tongue every 5 minutes as needed for Chest pain up to max of 3 total doses. If no relief after 1 dose, call 911.    Yes Historical Provider, MD   ondansetron (ZOFRAN) 8 MG tablet Take 8 mg by mouth every 8 hours as needed for Nausea or Vomiting   Yes Historical Provider, MD   acetaminophen (TYLENOL) 325 MG tablet Take 650 mg by mouth every 4 hours as needed for Pain or Fever   Yes Historical Provider, MD   sertraline (ZOLOFT) 50 MG tablet Take 50 mg by mouth at bedtime   Yes Historical Provider, MD   metFORMIN (GLUCOPHAGE) 1000 MG tablet Take 1,000 mg by mouth 2 times daily (with meals)    Historical Provider, MD   sitaGLIPtin (JANUVIA) 100 MG tablet Take 100 mg by mouth daily    Historical Provider, MD   Omeprazole (PRILOSEC PO) Take 40 mg by mouth daily    Historical Provider, MD   amLODIPine-benazepril (LOTREL) 5-10 MG per capsule Take 1 capsule by mouth daily    Historical Provider, MD   metoprolol (LOPRESSOR) 50 MG tablet Take 50 mg by mouth 2 times daily    Historical Provider, MD       ALLERGIES:     Patient has no known allergies. OBJECTIVE:       Vitals:    03/04/23 1304 03/04/23 2030 03/05/23 0300 03/05/23 0542   BP: (!) 115/58 (!) 115/53 137/60    Pulse: 70 83 87    Resp: 18 16 16    Temp: 97.9 °F (36.6 °C) 97.2 °F (36.2 °C) 97.5 °F (36.4 °C)    TempSrc: Temporal Temporal Temporal    SpO2: 95% 97% 98%    Weight:    98 lb 12.3 oz (44.8 kg)   Height:           No intake or output data in the 24 hours ending 03/05/23 0714      PHYSICAL EXAM:  General Appearance  Alert , awake , not in acute distress  HEENT - Head is normocephalic, atraumatic. Lungs - Bilateral equal air entry , no wheezes, rales or rhonchi, aeration good  Cardiovascular - Heart sounds are normal.  Regular rhythm, normal rate without murmur, gallop or rub.   Abdomen - Soft, nontender, nondistended, no masses or organomegaly  Neurologic - There are no new focal motor or sensory deficits  Skin - No bruising or bleeding on exposed skin area, dressing c/d/I  Extremities - No cyanosis, clubbing or edema      DIAGNOSTICS:     Laboratory Testing:    See [facility] EMR for lab data    Recent Results (from the past 24 hour(s))   CBC auto differential    Collection Time: 03/05/23  5:40 AM   Result Value Ref Range    WBC 4.4 3.5 - 11.3 k/uL    RBC 2.81 (L) 3.95 - 5.11 m/uL    Hemoglobin 8.6 (L) 11.9 - 15.1 g/dL    Hematocrit 26.6 (L) 36.3 - 47.1 %    MCV 94.7 82.6 - 102.9 fL    MCH 30.6 25.2 - 33.5 pg    MCHC 32.3 28.4 - 34.8 g/dL    RDW 14.6 (H) 11.8 - 14.4 %    Platelets 507 958 - 493 k/uL    MPV 10.6 8.1 - 13.5 fL    NRBC Automated 0.0 0.0 per 100 WBC    Seg Neutrophils 63 36 - 65 %    Lymphocytes 23 (L) 24 - 43 %    Monocytes 10 3 - 12 %    Eosinophils % 2 1 - 4 %    Basophils 1 0 - 2 %    Immature Granulocytes 1 (H) 0 %    Segs Absolute 2.84 1.50 - 8.10 k/uL    Absolute Lymph # 1.03 (L) 1.10 - 3.70 k/uL    Absolute Mono # 0.43 0.10 - 1.20 k/uL    Absolute Eos # 0.09 0.00 - 0.44 k/uL    Basophils Absolute 0.03 0.00 - 0.20 k/uL    Absolute Immature Granulocyte <0.03 0.00 - 0.30 k/uL   Comprehensive Metabolic Panel w/ Reflex to MG    Collection Time: 03/05/23  5:40 AM   Result Value Ref Range    Glucose 180 (H) 70 - 99 mg/dL    BUN 9 8 - 23 mg/dL    Creatinine 0.51 0.50 - 0.90 mg/dL    Est, Glom Filt Rate >60 >60 mL/min/1.73m2    Bun/Cre Ratio 18 9 - 20    Calcium 8.1 (L) 8.6 - 10.4 mg/dL    Sodium 134 (L) 135 - 144 mmol/L    Potassium 4.2 3.7 - 5.3 mmol/L    Chloride 100 98 - 107 mmol/L    CO2 29 20 - 31 mmol/L    Anion Gap 5 (L) 9 - 17 mmol/L    Alkaline Phosphatase 75 35 - 104 U/L    ALT 12 5 - 33 U/L    AST 12 <32 U/L    Total Bilirubin 0.4 0.3 - 1.2 mg/dL    Total Protein 5.0 (L) 6.4 - 8.3 g/dL    Albumin 2.7 (L) 3.5 - 5.2 g/dL    Albumin/Globulin Ratio 1.2 1.0 - 2.5         Current Facility-Administered Medications   Medication Dose Route Frequency Provider Last Rate Last Admin    sertraline (ZOLOFT) tablet 50 mg  50 mg Oral Nightly Inocente Gallegos MD        cefTRIAXone (ROCEPHIN) 1,000 mg in sodium chloride 0.9 % 50 mL IVPB (mini-bag)  1,000 mg IntraVENous Q24H Inocente Gallegos MD   Stopped at 03/04/23 0950    metFORMIN (GLUCOPHAGE) tablet 1,000 mg  1,000 mg Oral Daily with breakfast Inocente Gallegos MD        metoprolol tartrate (LOPRESSOR) tablet 50 mg  50 mg Oral BID Inocente Gallegos MD        atorvastatin (LIPITOR) tablet 10 mg  10 mg Oral Daily Inocente Gallegos MD   10 mg at 03/04/23 0919    vitamin D (CHOLECALCIFEROL) tablet 2,000 Units  2,000 Units Oral Daily Inocente Gallegos MD   2,000 Units at 03/04/23 0919    0.9 % sodium chloride infusion   IntraVENous Continuous Inocente Gallegos MD 75 mL/hr at 03/04/23 2150 New Bag at 03/04/23 2150    sodium chloride flush 0.9 % injection 10 mL  10 mL IntraVENous 2 times per day Inocente Gallegos MD   10 mL at 03/03/23 1942    sodium chloride flush 0.9 % injection 10 mL  10 mL IntraVENous PRN Inocente Gallegos MD        0.9 % sodium chloride infusion   IntraVENous PRN Inocente Gallegos MD        enoxaparin Sodium (LOVENOX) injection 30 mg  30 mg SubCUTAneous Daily Tiesha Max MD   30 mg at 03/04/23 0919    ondansetron (ZOFRAN-ODT) disintegrating tablet 4 mg  4 mg Oral Q8H PRN Tiesha Max MD        Or    ondansetron (ZOFRAN) injection 4 mg  4 mg IntraVENous Q6H PRN Tiesha Max MD        polyethylene glycol (GLYCOLAX) packet 17 g  17 g Oral Daily PRN Tiesha Max MD        acetaminophen (TYLENOL) tablet 650 mg  650 mg Oral Q6H PRN Tiesha Max MD        Or    acetaminophen (TYLENOL) suppository 650 mg  650 mg Rectal Q6H PRN Tiesha Max MD        potassium chloride (KLOR-CON M) extended release tablet 40 mEq  40 mEq Oral PRN Tiesha Max MD        Or    potassium bicarb-citric acid (EFFER-K) effervescent tablet 40 mEq  40 mEq Oral PRN Tiesha Max MD        Or    potassium chloride 10 mEq/100 mL IVPB (Peripheral Line)  10 mEq IntraVENous PRN Tiesha Max MD        magnesium sulfate 2000 mg in 50 mL IVPB premix  2,000 mg IntraVENous PRN Tiesha Max MD        [Held by provider] amLODIPine (NORVASC) tablet 5 mg  5 mg Oral Daily Tiesha Max MD        And    [Held by provider] lisinopril (PRINIVIL;ZESTRIL) tablet 10 mg  10 mg Oral Daily Tiesha Max MD        pantoprazole (PROTONIX) tablet 40 mg  40 mg Oral QAM AC Tiesha Max MD   40 mg at 03/04/23 0919       ASSESSMENT:     Principal Problem:    Hyponatremia  Active Problems:    Dysphagia    Nausea    Hiatal hernia  Resolved Problems:    * No resolved hospital problems. *      PLAN:     Primary Problem(s): Hyponatremia  Condition is stable  Treatment plan: Continue current treatment  Imaging: no further imaging studies ordered today  Medications: Ceftriaxone d2, culture is pending  Monitor labs, sodium is improving  Encourage PO intake, IVF for now   Resume BP meds as tolerated, metoprolol for now  PT/OT  Dispo pending clin.  status    DVT prophylaxis: Lovenox 40 mg SC  GI prophylaxis: Protonix 40 mg daily    Above plan discussed with the patient who agreed to the above plan     Discussed care plan with nurse after getting their input. Please note that this chart was generated using voice recognition Dragon dictation software. Although every effort was made to ensure the accuracy of this automated transcription, some errors in transcription may have occurred.     Conner Melendez MD  3/5/2023 7:14 AM

## 2023-03-05 NOTE — PLAN OF CARE
Problem: Discharge Planning  Goal: Discharge to home or other facility with appropriate resources  3/5/2023 1014 by Brionna Florez RN  Outcome: Progressing     Problem: Safety - Adult  Goal: Free from fall injury  3/5/2023 1014 by Brionna Florez RN  Outcome: Progressing     Problem: ABCDS Injury Assessment  Goal: Absence of physical injury  3/5/2023 1014 by Brionna Florez RN  Outcome: Progressing     Problem: Skin/Tissue Integrity  Goal: Absence of new skin breakdown  Description: 1. Monitor for areas of redness and/or skin breakdown  2. Assess vascular access sites hourly  3. Every 4-6 hours minimum:  Change oxygen saturation probe site  4. Every 4-6 hours:  If on nasal continuous positive airway pressure, respiratory therapy assess nares and determine need for appliance change or resting period.   3/5/2023 1014 by Brionna Florez RN  Outcome: Progressing     Problem: Pain  Goal: Verbalizes/displays adequate comfort level or baseline comfort level  3/5/2023 1014 by Brionna Florez RN  Outcome: Progressing

## 2023-03-05 NOTE — PROGRESS NOTES
Occupational Therapy  Facility/Department: [facility] AT THE [facility] MED SURG  Daily Treatment Note  NAME: Phyllis Alicia  : 1935  MRN: [id number]    Date of Service: 3/5/2023    Discharge Recommendations:  [address]         Patient Diagnosis(es): The primary encounter diagnosis was Elevated troponin. Diagnoses of General weakness and Hyponatremia were also pertinent to this visit. Assessment    Activity Tolerance: Patient limited by fatigue;Patient limited by endurance  Discharge Recommendations: Subacute/Skilled Nursing Facility      Plan   Occupational Therapy Plan  Times Per Week: Daily  Current Treatment Recommendations: Self-Care / ADL; Strengthening; Endurance training;Functional mobility training     Restrictions  Restrictions/Precautions  Restrictions/Precautions: General Precautions; Fall Risk;Weight Bearing;Contact Precautions  Lower Extremity Weight Bearing Restrictions  Right Lower Extremity Weight Bearing: Weight Bearing As Tolerated    Subjective   Subjective  Subjective: Pt lying in bed upon arrival. Pt agreed to participate in therapy session. Pain: Pt had no complaints of pain this date. Orientation  Overall Orientation Status: Within Functional Limits  Pain: Pt denies           Objective    Vitals     Bed Mobility Training  Bed Mobility Training: Yes  Overall Level of Assistance: Minimum assistance;Assist X1  Rolling: Minimum assistance;Assist X1  Supine to Sit: Minimum assistance;Assist X1  Scooting: Minimum assistance;Assist X1  Transfer Training  Transfer Training: Yes  Overall Level of Assistance: Minimum assistance;Assist X2  Interventions: Verbal cues  Sit to Stand: Minimum assistance;Assist X2  Stand to Sit: Minimum assistance;Assist X2  Gait Training  Gait Training: Yes  Gait  Overall Level of Assistance: Minimum assistance;Assist X2  Interventions: Verbal cues; Safety awareness training  Assistive Device: Walker, rolling     ADL  Grooming: Minimal assistance  Grooming Skilled Clinical Factors: Min A to comb hair d/t hair tangled. Safety Devices  Type of Devices: Chair alarm in place;Call light within reach;Gait belt;Nurse notified; Left in chair     Patient Education  Education Given To: Patient  Education Provided: Role of Therapy;Plan of Care  Education Method: Verbal  Barriers to Learning: None  Education Outcome: Verbalized understanding    Goals  Short Term Goals  Time Frame for Short Term Goals: 1 week  Short Term Goal 1: Pt. will tolerate 15-30 min. functional activity to improve strength for ADL's. Short Term Goal 2: Pt. will engage in self care to increase Ind. Short Term Goal 3: Pt. will state understanding to safety as relates to ADL's. Long Term Goals  Time Frame for Long Term Goals : 4 weeks  Long Term Goal 1: Pt. will return to PTA ADL status.   Patient Goals   Patient goals : Return to rehab       Therapy Time   Individual Concurrent Group Co-treatment   Time In 0700         Time Out 0723         Minutes 23                 AMRIT Jama/TAMAR

## 2023-03-06 VITALS
RESPIRATION RATE: 18 BRPM | SYSTOLIC BLOOD PRESSURE: 146 MMHG | HEART RATE: 68 BPM | BODY MASS INDEX: 20.17 KG/M2 | TEMPERATURE: 98.1 F | WEIGHT: 102.73 LBS | DIASTOLIC BLOOD PRESSURE: 68 MMHG | OXYGEN SATURATION: 98 % | HEIGHT: 60 IN

## 2023-03-06 LAB
ABSOLUTE EOS #: 0.12 K/UL (ref 0–0.44)
ABSOLUTE IMMATURE GRANULOCYTE: <0.03 K/UL (ref 0–0.3)
ABSOLUTE LYMPH #: 1.07 K/UL (ref 1.1–3.7)
ABSOLUTE MONO #: 0.49 K/UL (ref 0.1–1.2)
ALBUMIN SERPL-MCNC: 2.8 G/DL (ref 3.5–5.2)
ALBUMIN/GLOBULIN RATIO: 1.3 (ref 1–2.5)
ALP SERPL-CCNC: 78 U/L (ref 35–104)
ALT SERPL-CCNC: 12 U/L (ref 5–33)
ANION GAP SERPL CALCULATED.3IONS-SCNC: 6 MMOL/L (ref 9–17)
AST SERPL-CCNC: 14 U/L
BASOPHILS # BLD: 1 % (ref 0–2)
BASOPHILS ABSOLUTE: <0.03 K/UL (ref 0–0.2)
BILIRUB SERPL-MCNC: 0.4 MG/DL (ref 0.3–1.2)
BUN SERPL-MCNC: 7 MG/DL (ref 8–23)
BUN/CREAT BLD: 15 (ref 9–20)
CALCIUM SERPL-MCNC: 8.5 MG/DL (ref 8.6–10.4)
CHLORIDE SERPL-SCNC: 102 MMOL/L (ref 98–107)
CO2 SERPL-SCNC: 27 MMOL/L (ref 20–31)
CREAT SERPL-MCNC: 0.48 MG/DL (ref 0.5–0.9)
EOSINOPHILS RELATIVE PERCENT: 3 % (ref 1–4)
GFR SERPL CREATININE-BSD FRML MDRD: >60 ML/MIN/1.73M2
GLUCOSE SERPL-MCNC: 132 MG/DL (ref 70–99)
HCT VFR BLD AUTO: 27.4 % (ref 36.3–47.1)
HGB BLD-MCNC: 8.8 G/DL (ref 11.9–15.1)
IMMATURE GRANULOCYTES: 1 %
LYMPHOCYTES # BLD: 24 % (ref 24–43)
MCH RBC QN AUTO: 30.4 PG (ref 25.2–33.5)
MCHC RBC AUTO-ENTMCNC: 32.1 G/DL (ref 28.4–34.8)
MCV RBC AUTO: 94.8 FL (ref 82.6–102.9)
MONOCYTES # BLD: 11 % (ref 3–12)
NRBC AUTOMATED: 0 PER 100 WBC
PDW BLD-RTO: 14.6 % (ref 11.8–14.4)
PLATELET # BLD AUTO: 213 K/UL (ref 138–453)
PMV BLD AUTO: 10.3 FL (ref 8.1–13.5)
POTASSIUM SERPL-SCNC: 4.3 MMOL/L (ref 3.7–5.3)
PROT SERPL-MCNC: 5 G/DL (ref 6.4–8.3)
RBC # BLD: 2.89 M/UL (ref 3.95–5.11)
SARS-COV-2 RDRP RESP QL NAA+PROBE: NOT DETECTED
SEG NEUTROPHILS: 60 % (ref 36–65)
SEGMENTED NEUTROPHILS ABSOLUTE COUNT: 2.72 K/UL (ref 1.5–8.1)
SODIUM SERPL-SCNC: 135 MMOL/L (ref 135–144)
SPECIMEN DESCRIPTION: NORMAL
WBC # BLD AUTO: 4.4 K/UL (ref 3.5–11.3)

## 2023-03-06 PROCEDURE — 85025 COMPLETE CBC W/AUTO DIFF WBC: CPT

## 2023-03-06 PROCEDURE — 97110 THERAPEUTIC EXERCISES: CPT

## 2023-03-06 PROCEDURE — 6360000002 HC RX W HCPCS: Performed by: NURSE PRACTITIONER

## 2023-03-06 PROCEDURE — 36415 COLL VENOUS BLD VENIPUNCTURE: CPT

## 2023-03-06 PROCEDURE — 6360000002 HC RX W HCPCS: Performed by: STUDENT IN AN ORGANIZED HEALTH CARE EDUCATION/TRAINING PROGRAM

## 2023-03-06 PROCEDURE — 94761 N-INVAS EAR/PLS OXIMETRY MLT: CPT

## 2023-03-06 PROCEDURE — 87635 SARS-COV-2 COVID-19 AMP PRB: CPT

## 2023-03-06 PROCEDURE — C9803 HOPD COVID-19 SPEC COLLECT: HCPCS

## 2023-03-06 PROCEDURE — 2580000003 HC RX 258: Performed by: STUDENT IN AN ORGANIZED HEALTH CARE EDUCATION/TRAINING PROGRAM

## 2023-03-06 PROCEDURE — 80053 COMPREHEN METABOLIC PANEL: CPT

## 2023-03-06 PROCEDURE — 6370000000 HC RX 637 (ALT 250 FOR IP): Performed by: STUDENT IN AN ORGANIZED HEALTH CARE EDUCATION/TRAINING PROGRAM

## 2023-03-06 RX ORDER — SUCRALFATE 1 G/1
1 TABLET ORAL 4 TIMES DAILY
Qty: 120 TABLET | Refills: 3 | DISCHARGE
Start: 2023-03-06

## 2023-03-06 RX ORDER — LEVOFLOXACIN 750 MG/1
750 TABLET ORAL DAILY
Qty: 7 TABLET | Refills: 0 | Status: SHIPPED | OUTPATIENT
Start: 2023-03-06 | End: 2023-03-13

## 2023-03-06 RX ORDER — PANTOPRAZOLE SODIUM 40 MG/1
40 TABLET, DELAYED RELEASE ORAL
Qty: 30 TABLET | Refills: 3 | DISCHARGE
Start: 2023-03-07

## 2023-03-06 RX ORDER — LEVOFLOXACIN 5 MG/ML
750 INJECTION, SOLUTION INTRAVENOUS EVERY 24 HOURS
Status: DISCONTINUED | OUTPATIENT
Start: 2023-03-06 | End: 2023-03-06 | Stop reason: HOSPADM

## 2023-03-06 RX ADMIN — SUCRALFATE 1 G: 1 TABLET ORAL at 11:41

## 2023-03-06 RX ADMIN — DICYCLOMINE HYDROCHLORIDE 10 MG: 10 CAPSULE ORAL at 11:41

## 2023-03-06 RX ADMIN — SUCRALFATE 1 G: 1 TABLET ORAL at 05:38

## 2023-03-06 RX ADMIN — METFORMIN HYDROCHLORIDE 1000 MG: 500 TABLET ORAL at 08:36

## 2023-03-06 RX ADMIN — LEVOFLOXACIN 750 MG: 5 INJECTION, SOLUTION INTRAVENOUS at 08:37

## 2023-03-06 RX ADMIN — ATORVASTATIN CALCIUM 10 MG: 10 TABLET, FILM COATED ORAL at 08:36

## 2023-03-06 RX ADMIN — DICYCLOMINE HYDROCHLORIDE 10 MG: 10 CAPSULE ORAL at 06:46

## 2023-03-06 RX ADMIN — SODIUM CHLORIDE: 9 INJECTION, SOLUTION INTRAVENOUS at 00:25

## 2023-03-06 RX ADMIN — SUCRALFATE 1 G: 1 TABLET ORAL at 00:25

## 2023-03-06 RX ADMIN — ENOXAPARIN SODIUM 30 MG: 100 INJECTION SUBCUTANEOUS at 08:36

## 2023-03-06 RX ADMIN — SODIUM CHLORIDE, PRESERVATIVE FREE 10 ML: 5 INJECTION INTRAVENOUS at 08:37

## 2023-03-06 RX ADMIN — METOPROLOL TARTRATE 50 MG: 50 TABLET, FILM COATED ORAL at 08:36

## 2023-03-06 RX ADMIN — FAMOTIDINE 20 MG: 20 TABLET ORAL at 08:36

## 2023-03-06 RX ADMIN — Medication 2000 UNITS: at 08:36

## 2023-03-06 RX ADMIN — PANTOPRAZOLE SODIUM 40 MG: 40 TABLET, DELAYED RELEASE ORAL at 06:46

## 2023-03-06 NOTE — PROGRESS NOTES
Assessment and vitals obtained at this time as charted. Pt A&O x4 and denies pain. Pt changed d/t incontinence at this time. Pt denies any further needs. Call light within reach, care ongoing.

## 2023-03-06 NOTE — PROGRESS NOTES
Report called at [facility] to [facility]. Receiving nurse unavailable so DON of [facility] accepted report.

## 2023-03-06 NOTE — PROGRESS NOTES
Physical Therapy  Facility/Department: [facility] AT THE [facility] MED SURG  Daily Treatment Note  NAME: Marily Flores  : 1935  MRN: [id number]    Date of Service: 3/6/2023    Discharge Recommendations:  Subacute/Skilled Nursing Facility, Continue to assess pending progress        Patient Diagnosis(es): The primary encounter diagnosis was Elevated troponin. Diagnoses of General weakness and Hyponatremia were also pertinent to this visit. Assessment   Assessment: Pt very fatigued and refused out of bed activity d/t having emesis and increased nausea after getting up to chair with PT yetserday. She states she wants to rest before going back to rehab. Agreed to participate in bed exercises. able to complete LE exercises with short RBs. She did complete at slow pace and occasionally drifting off to sleep so cued on staying on task. Activity Tolerance: Patient limited by fatigue;Patient limited by endurance     Plan    Physcial Therapy Plan  General Plan: 2 times a day 7 days a week     Restrictions  Restrictions/Precautions  Restrictions/Precautions: General Precautions, Fall Risk, Weight Bearing, Contact Precautions  Lower Extremity Weight Bearing Restrictions  Right Lower Extremity Weight Bearing: Weight Bearing As Tolerated     Subjective    Subjective  Subjective: Pt in bed upon entry. SHe reports she was \"very sick yesterday\" and she states she got sick once she got up to the chair after she finished with PT/OT yesterday. Pt refuses out of bed at this time to avoid getting sick again she states. Agreed to bed exercises. Pain: Pt denies     Objective   Vitals     Bed Mobility Training  Bed Mobility Training: No  Transfer Training  Transfer Training: No (Pt refuses d/t episode of emesis after getting up in chair yesterday.)  Gait Training  Gait Training: No           Safety Devices  Type of Devices: Call light within reach;Nurse notified; Left in bed;Bed alarm in place       Goals  Short Term Goals  Time Frame for Short Term Goals: 10 days  Short Term Goal 1: Patient will ambulate 8' with FWW, CGA, without LOB  Short Term Goal 2: Patient will perform transfers and bed mobility with CGA  Short Term Goal 3: Patient will tolerate 20-30 minutes of therex/act to improve endurance for ADLs.   Patient Goals   Patient Goals : Improve mobility    Education  Patient Education  Education Given To: Patient  Education Provided: Role of Therapy;Plan of Care;Transfer Training  Education Method: Verbal  Barriers to Learning: None  Education Outcome: Verbalized understanding    Therapy Time   Individual Concurrent Group Co-treatment   Time In 0959         Time Out 1023         Minutes 210 [facility]

## 2023-03-06 NOTE — PROGRESS NOTES
Patient is discharge back to [facility]. Discharge paper work done and fax to SNF. She will go by [facility].     NIMISHA Fierro

## 2023-03-06 NOTE — DISCHARGE SUMMARY
Discharge Summary    Rico Sparks  :  1935  MRN:  [id number]    Admit date:  3/3/2023      Discharge date: 3/6/2023     Admitting Physician:  Megan Jaeger MD    Discharge Diagnoses:    Principal Problem:    Hyponatremia  Active Problems:    Dysphagia    Nausea    Hiatal hernia  Resolved Problems:    * No resolved hospital problems. *      Hospital Course:   Rico Sparks is a 81 y.o. female admitted with resented to the emergency room with abnormal labs. Patient was sent over due to hyponatremia and complaints of generalized weakness and fatigue. Patient reported that since having surgery he has been having years of mid epigastric pain and heartburn. He states this occurs on a daily basis. Daughter reported minimal amounts of oral intake. [facility] with recent right femur fracture. During patient's hospitalizations patient was given IV fluids and labs were trended. Patient had Protonix and Carafate added to his Pepcid and improved his symptoms. Urine culture was positive with E. coli and Enterococcus and patient was placed on oral Levaquin and will continue this on discharge for 7 days. Hemodynamically he is stable. Plan will be to discharge back to [facility] today. Consultants:  none    Procedures: none    Complications: none    Discharge Condition: fair    Exam:  GEN:    Awake, alert and oriented x3. EYES:   EOMI, pupils equal   NECK: Supple. No lymphadenopathy. No carotid bruit  CVS:     regular rate and rhythm, no audible murmur  PULM:  CTA, no wheezes, rales or rhonchi, no acute respiratory distress  ABD:     Bowels sounds normal.  Abdomen is soft. No distention. no tenderness to palpation. EXT:     no edema bilaterally . No calf tenderness. NEURO: Moves all extremities. Motor and sensory are grossly intact  SKIN:    No rashes.   No skin lesions    Significant Diagnostic Studies:   Lab Results   Component Value Date    WBC 4.4 2023 HGB 8.8 (L) 03/06/2023     03/06/2023       Lab Results   Component Value Date    BUN 7 (L) 03/06/2023    CREATININE 0.48 (L) 03/06/2023     03/06/2023    K 4.3 03/06/2023    CALCIUM 8.5 (L) 03/06/2023     03/06/2023    CO2 27 03/06/2023    LABGLOM >60 03/06/2023       Lab Results   Component Value Date    WBCUA 20 TO 50 03/03/2023    RBCUA 0 TO 2 03/03/2023    EPITHUA 2 TO 5 03/03/2023    LEUKOCYTESUR SMALL (A) 03/03/2023    SPECGRAV <1.005 (L) 03/03/2023    GLUCOSEU NEGATIVE 03/03/2023    KETUA NEGATIVE 03/03/2023    PROTEINU NEGATIVE 03/03/2023    HGBUR NEGATIVE 03/03/2023    CASTUA NOT REPORTED 02/02/2021    CRYSTUA 2 TO 5 CALCIUM OXALATE (A) 02/02/2021    BACTERIA 2+ (A) 03/03/2023    YEAST NOT REPORTED 02/02/2021       CT ABDOMEN PELVIS W IV CONTRAST Additional Contrast? None    Result Date: 3/3/2023  EXAMINATION: CT OF THE ABDOMEN AND PELVIS WITH CONTRAST 3/3/2023 1:39 pm TECHNIQUE: CT of the abdomen and pelvis was performed with the administration of intravenous contrast. Multiplanar reformatted images are provided for review. Automated exposure control, iterative reconstruction, and/or weight based adjustment of the mA/kV was utilized to reduce the radiation dose to as low as reasonably achievable. COMPARISON: 01/24/2020. HISTORY: ORDERING SYSTEM PROVIDED HISTORY: abd pain recent surgery concern for sbo TECHNOLOGIST PROVIDED HISTORY: abd pain recent surgery concern for sbo Decision Support Exception - unselect if not a suspected or confirmed emergency medical condition->Emergency Medical Condition (MA) FINDINGS: Lower Chest: The lung bases are clear. There is no pleural effusion. Organs: Small low-attenuation liver lesions are stable and believed represent cysts. There has been a cholecystectomy. The spleen, adrenal glands and pancreas appear normal.  The kidneys enhance normally. There is no hydronephrosis or ureteral stone. GI/Bowel:  There is no bowel dilatation or bowel wall thickening. The appendix appears normal.  A small hiatal hernia is smaller than before. There is fluid in the esophagus. Pelvis: The bladder appears normal.  There is no abnormal adnexal mass. Peritoneum/Retroperitoneum: No evidence of lymphadenopathy. Aorta is normal in caliber. No fat stranding, free fluid, free air or focal fluid collection is identified. Bones/Soft Tissues: No destructive bone lesion is identified. 1. No acute findings in the abdomen or pelvis. 2. Decreased size of a now small hiatal hernia. There is fluid in the esophagus which could reflect esophageal dysmotility or gastroesophageal reflux. 3. Stable small low-attenuation liver lesions favored to represent cysts.        Assessment and Plan:  Patient Active Problem List    Diagnosis Date Noted    Hyponatremia 03/03/2023    Severe protein-calorie malnutrition ([facility] [address] 12/16/2020    COVID-19 12/15/2020    Hiatal hernia 02/06/2020    Weight loss 01/17/2020    Nausea 01/17/2020    Pain of upper abdomen 01/17/2020    Diverticulosis     Hemorrhoids     Change in bowel habit 11/04/2019    Dysphagia 01/26/2016    Diarrhea 01/09/2016        Discharge Medications:         Medication List        START taking these medications      levoFLOXacin 750 MG tablet  Commonly known as: LEVAQUIN  Take 1 tablet by mouth daily for 7 days     pantoprazole 40 MG tablet  Commonly known as: PROTONIX  Take 1 tablet by mouth every morning (before breakfast)  Start taking on: March 7, 2023     sucralfate 1 GM tablet  Commonly known as: CARAFATE  Take 1 tablet by mouth 4 times daily            CONTINUE taking these medications      * acetaminophen 650 MG suppository  Commonly known as: TYLENOL     * acetaminophen 325 MG tablet  Commonly known as: TYLENOL     aluminum & magnesium hydroxide-simethicone 200-200-20 MG/5ML Susp suspension  Commonly known as: MAALOX     amLODIPine-benazepril 5-10 MG per capsule  Commonly known as: LOTREL     atorvastatin 10 MG tablet  Commonly known as: LIPITOR     bisacodyl 10 MG suppository  Commonly known as: DULCOLAX     calcium carbonate 750 MG chewable tablet  Commonly known as: TUMS EX     dicyclomine 10 MG capsule  Commonly known as: BENTYL     enoxaparin 40 MG/0.4ML  Commonly known as: LOVENOX     famotidine 20 MG tablet  Commonly known as: PEPCID     loperamide 2 MG capsule  Commonly known as: IMODIUM     magnesium hydroxide 400 MG/5ML suspension  Commonly known as: MILK OF MAGNESIA     metFORMIN 1000 MG tablet  Commonly known as: GLUCOPHAGE     metoprolol tartrate 50 MG tablet  Commonly known as: LOPRESSOR     nitroGLYCERIN 0.4 MG SL tablet  Commonly known as: NITROSTAT     ondansetron 8 MG tablet  Commonly known as: ZOFRAN     potassium chloride 10 MEQ extended release capsule  Commonly known as: MICRO-K     sertraline 50 MG tablet  Commonly known as: ZOLOFT     SITagliptin 100 MG tablet  Commonly known as: JANUVIA     sodium phosphate 7-19 GM/118ML           * This list has 2 medication(s) that are the same as other medications prescribed for you. Read the directions carefully, and ask your doctor or other care provider to review them with you. STOP taking these medications      PRILOSEC PO               Where to Get Your Medications        You can get these medications from any pharmacy    Bring a paper prescription for each of these medications  levoFLOXacin 750 MG tablet       Information about where to get these medications is not yet available    Ask your nurse or doctor about these medications  pantoprazole 40 MG tablet  sucralfate 1 GM tablet         Patient Instructions:    Activity: activity as tolerated  Diet: encourage fluids  Wound Care: none needed  Other: CBC with differential and BMP in 1 week    Disposition:   [place] to [facility]    Follow up:  Patient will be followed by Chriss Florentino MD in 1-2 weeks    CORE MEASURES on Discharge (if applicable)  ACE/ARB in CHF: NA  Statin in MI: NA  ASA in MI: NA  Statin in CVA: NA  Antiplatelet in CVA: NA    Total time spent on discharge services: 40 minutes    Including the following activities:  Evaluation and Management of patient  Discussion with patient and/or surrogate about current care plan  Coordination with Case Management and/or   Coordination of care with Consultants (if applicable)   Coordination of care with Receiving Facility Physician (if applicable)  Completion of DME forms (if applicable)  Preparation of Discharge Summary  Preparation of Medication Reconciliation  Preparation of Discharge Prescriptions    Signed:  PONCE Whiting - CNP, PONCE, NP-C  3/6/2023, 11:26 AM

## 2023-03-06 NOTE — PROGRESS NOTES
Patient left the floor at this time. Patient is being discharged to [facility] via [facility]. Dentures were sent with daughter Rayshawn Chau, who will be bringing them to the patient later this evening.

## 2023-03-06 NOTE — PROGRESS NOTES
Entered patient's room for morning vital signs and head to toe assessment. Patient resting in the bed at this time. A&O x4, calm, and cooperative. Patient denies pain at this time. Vital signs and head to toe assessment completed at this time, see flowsheets for more details. Patient denies no more needs at this time. Call light within reach. Bed alarm on. Bed wheels locked. Bed in lowest position.

## 2023-03-06 NOTE — CARE COORDINATION
Case Management Assessment  Initial Evaluation    Date/Time of Evaluation: 3/6/2023 10:15 AM  Assessment Completed by: NIMISHA Palafox    If patient is discharged prior to next notation, then this note serves as note for discharge by case management. Patient Name: Betzaida Cintron                   YOB: 1935  Diagnosis: Hyponatremia [E87.1]  General weakness [R53.1]  Elevated troponin [R77.8]                   Date / Time: 3/3/2023  2:51 PM    Patient Admission Status: Inpatient   Readmission Risk (Low < 19, Mod (19-27), High > 27): Readmission Risk Score: 18    Current PCP: Jonathan Angulo MD  PCP verified by CM? Yes    Chart Reviewed: Yes      History Provided by: Patient  Patient Orientation: Alert and Oriented, Person, Place, Situation, Self    Patient Cognition: Alert    Hospitalization in the last 30 days (Readmission):  Yes    If yes, Readmission Assessment in  Navigator will be completed. Advance Directives:      Code Status: Full Code   Patient's Primary Decision Maker is: Legal Next of Kin    [facility] [phone]    Discharge Planning:    Patient lives with: Alone Type of Home: Skilled Nursing Facility  Primary Care Giver: Self  Patient Support Systems include: Children, Other (Comment) (SNF)   Current Financial resources: Medicare  Current community resources: Transportation, Other (Comment) (SNF)  Current services prior to admission: [address] [facility], [facility], Transportation            Current DME: Will Klippel, Wheelchair            Type of Home Care services:  [employer], Skilled Therapy, Nursing Services    ADLS  Prior functional level: Assistance with the following:, Mobility, Shopping, Cooking, Housework  Current functional level: Assistance with the following:, Cooking, Housework, Shopping, Mobility    PT AM-PAC:   /24  OT AM-PAC:   /24    Family can provide assistance at DC:  Yes  Would you like Case Management to discuss the discharge plan with any other family members/significant others, and if so, who? No  Plans to Return to Present Housing: Yes  Other Identified Issues/Barriers to RETURNING to current housing: none  Potential Assistance needed at discharge: [address], [facility], [facility]            Potential DME: Vanessa Mcgraw, Wheelchair  Patient expects to discharge to: [address]oon [address] for transportation at discharge: [address]: [address] / Plan: MEDICARE PART A AND B / Product Type: *No Product type* /     Does insurance require precert for SNF: No    Potential assistance Purchasing Medications: No  Meds-to-Beds request:  no      [address] [address] [facility], [address] [place] [address] Street. Goran Espinosa [phone] - F [phone]  [address] [place] [address]. [facility] [address]  Phone: [phone] Fax: [phone]      Notes:    Factors facilitating achievement of predicted outcomes: Family support, Cooperative, Pleasant, and Has needed Durable Medical Equipment at home    Barriers to discharge: none    Additional Case Management Notes: Patient is a  and normally lives alone in her house. [facility] fx. She is using a walker and wheelchair. She requires some assistance with her ADL's. [facility] and [facility]. The discharge plan is to return to skilled care to [facility] when medically stable. The Plan for Transition of Care is related to the following treatment goals of Hyponatremia [E87.1]  General weakness [R53.1]  Elevated troponin [R77.8]    Transportation/Food Security/Housekeeping Addressed: No issues identified or concerns. The Patient and/or Patient Representative Agree with the Discharge Plan? Yes    The discharge plan is to return to skilled care to [facility] when medically stable.     NIMISHA Jon  Case Management Department  Ph: [phone]

## 2023-03-06 NOTE — PROGRESS NOTES
Progress Note    SUBJECTIVE:    Patient seen for f/u of Hyponatremia. She resting in bed feels better than yesterday. No other distress     ROS:   Constitutional: negative  for fevers, and negative for chills. Respiratory: negative for shortness of breath, negative for cough, and negative for wheezing  Cardiovascular: negative for chest pain, and negative for palpitations  Gastrointestinal: negative for abdominal pain, negative for nausea,negative for vomiting, negative for diarrhea, and negative for constipation     All other systems were reviewed with the patient and are negative unless otherwise stated in HPI      OBJECTIVE:      Vitals:   Vitals:    03/06/23 0639   BP: (!) 146/68   Pulse: 68   Resp: 18   Temp: 98.1 °F (36.7 °C)   SpO2: 98%     Weight: 102 lb 11.8 oz (46.6 kg)   Height: 4' 11.5\" (151.1 cm)     Weight  Wt Readings from Last 3 Encounters:   03/06/23 102 lb 11.8 oz (46.6 kg)   02/15/23 106 lb 9.6 oz (48.4 kg)   07/06/21 97 lb 9.6 oz (44.3 kg)     Body mass index is 20.4 kg/m². 24HR INTAKE/OUTPUT:      Intake/Output Summary (Last 24 hours) at 3/6/2023 0713  Last data filed at 3/6/2023 0543  Gross per 24 hour   Intake 3562.9 ml   Output --   Net 3562.9 ml     -----------------------------------------------------------------  Exam:    GEN:    Awake, alert and oriented x3. EYES:  EOMI, pupils equal   NECK: Supple. No lymphadenopathy. No carotid bruit  CVS:    regular rate and rhythm, no audible murmur  PULM:  CTA, no wheezes, rales or rhonchi, no acute respiratory distress  ABD:    Bowels sounds normal.  Abdomen is soft. No distention. no tenderness to palpation. EXT:   no edema bilaterally . No calf tenderness. NEURO: Moves all extremities. Motor and sensory are grossly intact  SKIN:  No rashes.   No skin lesions.    -----------------------------------------------------------------    Diagnostic Data:      Complete Blood Count:   Recent Labs     03/04/23  0550 03/05/23  0540 03/06/23  0600   WBC 5.3 4.4 4.4   RBC 2.78* 2.81* 2.89*   HGB 8.5* 8.6* 8.8*   HCT 25.9* 26.6* 27.4*   MCV 93.2 94.7 94.8   MCH 30.6 30.6 30.4   MCHC 32.8 32.3 32.1   RDW 14.6* 14.6* 14.6*    223 213   MPV 10.5 10.6 10.3        Last 3 Blood Glucose:   Recent Labs     03/03/23  1518 03/04/23  0550 03/05/23  0540 03/06/23  0600   GLUCOSE 182* 144* 180* 132*        Comprehensive Metabolic Profile:   Recent Labs     03/04/23  0550 03/05/23  0540 03/06/23  0600   * 134* 135   K 3.9 4.2 4.3   CL 96* 100 102   CO2 28 29 27   BUN 12 9 7*   CREATININE 0.46* 0.51 0.48*   GLUCOSE 144* 180* 132*   CALCIUM 8.2* 8.1* 8.5*   PROT 5.0* 5.0* 5.0*   LABALBU 2.8* 2.7* 2.8*   BILITOT 0.7 0.4 0.4   ALKPHOS 73 75 78   AST 13 12 14   ALT 13 12 12        Urinalysis:   Lab Results   Component Value Date/Time    NITRU NEGATIVE 03/03/2023 02:04 AM    COLORU Yellow 03/03/2023 02:04 AM    PHUR 7.5 03/03/2023 02:04 AM    WBCUA 20 TO 50 03/03/2023 02:04 AM    RBCUA 0 TO 2 03/03/2023 02:04 AM    RBCUA 3-10 12/23/2020 12:43 PM    MUCUS NOT REPORTED 02/02/2021 07:00 PM    TRICHOMONAS NOT REPORTED 02/02/2021 07:00 PM    YEAST NOT REPORTED 02/02/2021 07:00 PM    BACTERIA 2+ 03/03/2023 02:04 AM    CLARITYU Clear 12/23/2020 12:43 PM    SPECGRAV <1.005 03/03/2023 02:04 AM    LEUKOCYTESUR SMALL 03/03/2023 02:04 AM    UROBILINOGEN Normal 03/03/2023 02:04 AM    BILIRUBINUR NEGATIVE 03/03/2023 02:04 AM    BILIRUBINUR Small 12/23/2020 12:43 PM    BLOODU Moderate 12/23/2020 12:43 PM    GLUCOSEU NEGATIVE 03/03/2023 02:04 AM    KETUA NEGATIVE 03/03/2023 02:04 AM    AMORPHOUS NOT REPORTED 02/02/2021 07:00 PM       HgBA1c:    Lab Results   Component Value Date/Time    LABA1C 7.3 09/28/2015 09:09 AM       Lactic Acid:   Lab Results   Component Value Date/Time    LACTA 3.3 12/23/2020 12:44 PM    LACTA 1.5 12/15/2020 08:53 PM        Troponin: No results for input(s): TROPONINI in the last 72 hours.     CRP:  No results for input(s): CRP in the last 72 hours. Radiology/Imaging:  CT ABDOMEN PELVIS W IV CONTRAST Additional Contrast? None   Final Result   1. No acute findings in the abdomen or pelvis. 2. Decreased size of a now small hiatal hernia. There is fluid in the   esophagus which could reflect esophageal dysmotility or gastroesophageal   reflux. 3. Stable small low-attenuation liver lesions favored to represent cysts. ASSESSMENT / PLAN:    MEDICAL DECISION MAKING:    Primary Problem(s): Hyponatremia  Differential diagnoses: Dehydration  Condition is an undiagnosed new problem with uncertain prognosis  Condition is resolved  Treatment plan: Continue current treatment  Imaging: no further imaging studies ordered today  Medications: Medications not indicated at this time  Medication Monitoring / High Risk Medications: none      UTI    Condition is improving  Treatment plan: Continue current treatment  Imaging: no further imaging studies ordered today  Medications:   Stop Rocephin  Start levoquin        Nutrition status: Well developed, well nourished with no malnutrition  Dietician consult initiated    Hospital Prophylaxis:   DVT: Lovenox   Stress Ulcer: H2 Blocker     Disposition:  Shared decision making:  All test results, treatment options and disposition options were discussed with the patient today  Social determinants of health that may impact management: none  Code status: Full Code   Disposition: Discharge plan is [place]    [facility] Advanced Care Planning documentation:  [x] I have confirmed that the patient's Advance Care Plan is present, Code Status is documented, or surrogate decision maker is listed in the patient's medical record  [If \"yes\", STOP HERE]     [] The patient's Advance Care Plan is NOT present because:    []  I confirmed today that the patient does not wish or was not able to name a   surrogate decision maker or provide and advance care plan.    [] Hospice care is currently being provided or has been provided within the   calendar year. []  I did NOT confirm today the presence of an [address] or surrogate   decision maker documented within the patient's medical record.    [DOES NOT SATISFY PONCE Hill - CNP , PONCE, NP-C  Hospitalist Medicine        3/6/2023, 7:13 AM

## 2023-03-06 NOTE — PROGRESS NOTES
Reassessment and vitals obtained at this time as charted. No changes from previous assessment. Pt was changed d/t incontinence. Pt denies any further needs at this time. Call light within reach, care ongoing.

## 2023-03-06 NOTE — PROGRESS NOTES
Nutrition Assessment     Type and Reason for Visit: Initial    Nutrition Recommendations/Plan:   Continue current diet. Modify ONS to 1 box berry ensure clear TID with meals. Malnutrition Assessment:  Malnutrition Status: At risk for malnutrition (Comment)    Nutrition Assessment:  Altered nutrition related labs r/t impaired nutrient utilization/endocrine dysfunction aeb Na was 131, now wnl at 135, A1c 7.3, glucose 132. Increased nutrient needs r/t R femur Fx-was at [facility]. Emesis' noted yesterday. Pt encouraged to include protein foods for healing needs of Fx. Pt states Weight gain trend after having COVID in 2020, ended up with a lot of \"stomach issues. \" Was fed by tube feeding for a while. States N/V yesterday, better today. Dislikes ensure enlive, would like to try berry ensure clear. Nutrition Related Findings:   no malnutrition indices Wound Type: None    Current Nutrition Therapies:    ADULT DIET; Regular  ADULT ORAL NUTRITION SUPPLEMENT; Breakfast, AM Snack, Lunch, PM Snack, Dinner, HS Snack; Standard High Calorie/High Protein Oral Supplement    Anthropometric Measures:  Height: 4' 11.5\" (151.1 cm)  Current Body Wt: 102 lb 11.8 oz (46.6 kg)   BMI: 20.4  Lab Results   Component Value Date/Time    LABA1C 7.3 09/28/2015 09:09 AM      Recent Labs     03/04/23  0550 03/05/23  0540 03/06/23  0600   * 134* 135   K 3.9 4.2 4.3   CL 96* 100 102   CO2 28 29 27   BUN 12 9 7*   CREATININE 0.46* 0.51 0.48*   GLUCOSE 144* 180* 132*   ALT 13 12 12   ALKPHOS 73 75 78      Lab Results   Component Value Date/Time    LABALBU 2.8 03/06/2023 06:00 AM     No results for input(s): POCGLU in the last 72 hours.    No results found for: TRIG, HDL, LDLCALC, LDLDIRECT, LABVLDL   Nutrition Diagnosis:   Altered nutrition-related lab values related to impaired nutrient utilization, endocrine dysfuntion as evidenced by lab values    Nutrition Interventions:   Food and/or Nutrient Delivery: Modify Oral Nutrition Supplement, Continue Current Diet (d/c ensure enlive, start berry ensure clear)  Nutrition Education/Counseling: No recommendation at this time     Plan of Care discussed with: Patient    Goals:     Goals: Meet at least 75% of estimated needs       Nutrition Monitoring and Evaluation:   Behavioral-Environmental Outcomes: None Identified  Food/Nutrient Intake Outcomes: Food and Nutrient Intake  Physical Signs/Symptoms Outcomes: Biochemical Data, Weight, Nausea or Vomiting    Discharge Planning:    Continue current diet     jC Pickett, [address], [place]  Contact: [phone]

## 2023-03-06 NOTE — DISCHARGE INSTR - COC
Continuity of Care Form    Patient Name: Mathew Coffman   :  1935  MRN:  [id number]    Admit date:  3/3/2023  Discharge date:  2023    Code Status Order: Full Code   Advance Directives:     Admitting Physician:  Lesley Narvaez MD  PCP: Elier Jackson MD    Discharging Nurse: Renée Mojica RN  [address] Unit/Room#: 0316/0316-01  Discharging Unit Phone Number: [phone]    Emergency Contact:   Extended Emergency Contact Information  Primary Emergency Contact: [facility] Phone: [phone]  Relation: Child    Past Surgical History:  Past Surgical History:   Procedure Laterality Date    CARDIAC CATHETERIZATION  ?     CHOLECYSTECTOMY, LAPAROSCOPIC      COLONOSCOPY      COLONOSCOPY  2016    -hemorrhoids    COLONOSCOPY  2019    Dr. Gisella Stauffer bx(normal)diverticulosis,hemorrhoids    COLONOSCOPY N/A 2019    COLONOSCOPY POLYPECTOMY SNARE/COLD BIOPSY performed by Lula Logan MD at [address] Right 2023    [facility]    HERNIA REPAIR  2020    hiatal    PARTIAL HYSTERECTOMY (CERVIX NOT REMOVED)      ROTATOR CUFF REPAIR Right     TOTAL KNEE ARTHROPLASTY Left     UPPER GASTROINTESTINAL ENDOSCOPY  2016    bx,dilation    UPPER GASTROINTESTINAL ENDOSCOPY  2020    (mild chronic gastritis,neg H-Pylori)hiatal hernia    UPPER GASTROINTESTINAL ENDOSCOPY N/A 2020    EGD BIOPSY performed by Lula Logan MD at [address]       Immunization History:   Immunization History   Administered Date(s) Administered    COVID-19, MODERNA BLUE border, Primary or Immunocompromised, (age 12y+), IM, 100 mcg/0.5mL 2021    COVID-19, MODERNA Booster BLUE border, (age 18y+), IM, 50mcg/0.25mL 2022    COVID-19, PFIZER PURPLE top, DILUTE for use, (age 15 y+), 30mcg/0.3mL 2021, 2021    Influenza, High Dose (Fluzone 65 yrs and older) 10/14/2019    Pneumococcal Polysaccharide ([id number]) 10/14/2019    Zoster Recombinant (Shingrix) 2019       Active Problems:  Patient Active Problem List   Diagnosis Code    Diarrhea R19.7    Dysphagia R13.10    Change in bowel habit R19.4    Diverticulosis K57.90    Hemorrhoids K64.9    Weight loss R63.4    Nausea R11.0    Pain of upper abdomen R10.10    Hiatal hernia K44.9    COVID-19 U07.1    Severe protein-calorie malnutrition ([facility] [address] E43    Hyponatremia E87.1       Isolation/Infection:   Isolation            C Diff Contact          Patient Infection Status       Infection Onset Added Last Indicated Last Indicated By Review Planned Expiration Resolved Resolved By    C-diff Rule Out 20 Clostridium difficile toxin/antigen (Ordered)        Resolved    COVID-19 12/15/20 12/15/20 12/15/20 Covid-19 Ambulatory   20     COVID-19 (Rule Out) 12/15/20 12/15/20 12/15/20 Covid-19 Ambulatory (Ordered)   12/15/20 Rule-Out Test Resulted            Nurse Assessment:  Last Vital Signs: BP (!) 146/68   Pulse 68   Temp 98.1 °F (36.7 °C) (Temporal)   Resp 18   Ht 4' 11.5\" (1.511 m)   Wt 102 lb 11.8 oz (46.6 kg)   SpO2 98%   BMI 20.40 kg/m²     Last documented pain score (0-10 scale): Pain Level: 0  Last Weight:   Wt Readings from Last 1 Encounters:   23 102 lb 11.8 oz (46.6 kg)     Mental Status:  oriented and alert    IV Access:  - None    Nursing Mobility/ADLs:  Walking   Assisted  Transfer  Assisted  Bathing  Assisted  Dressing  Assisted  Toileting  Assisted  Feeding  Independent  Med Admin  Independent  Med Delivery   crushed and prefers mixed with applesauce    Wound Care Documentation and Therapy:        Elimination:  Continence:    Bowel: Yes  Bladder: Intermittent incontinence  Urinary Catheter: None   Colostomy/Ileostomy/Ileal Conduit: No       Date of Last BM: 2023    Intake/Output Summary (Last 24 hours) at 3/6/2023 1025  Last data filed at 3/6/2023 1005  Gross per 24 hour   Intake 1845.7 ml   Output 100 ml   Net 1745.7 ml     I/O last 3 completed shifts: In: 3562.9 [I.V.:3562.9]  Out: -     Safety Concerns: At Risk for Falls    Impairments/Disabilities:      None    Nutrition Therapy:  Current Nutrition Therapy:   - Oral Diet:  General    Routes of Feeding: Oral  Liquids: Thin Liquids  Daily Fluid Restriction: no  Last Modified Barium Swallow with Video (Video Swallowing Test): not done    Treatments at the Time of Hospital Discharge:   Respiratory Treatments: N/A  Oxygen Therapy:  is not on home oxygen therapy. Ventilator:    - No ventilator support    Rehab Therapies: Physical Therapy and Occupational Therapy  Weight Bearing Status/Restrictions: No weight bearing restrictions  Other Medical Equipment (for information only, NOT a DME order):  walker and bedside commode  Other Treatments: N/A    Patient's personal belongings (please select all that are sent with patient):  Glasses, Dentures upper and lower    RN SIGNATURE:  Electronically signed by Doyle Essex, RN on 3/6/23 at 11:57 AM EST    CASE MANAGEMENT/SOCIAL WORK SECTION    Inpatient Status Date: 3/3/23    Readmission Risk Assessment Score:  Readmission Risk              Risk of Unplanned Readmission:  19           Discharging to Facility/ Agency   Name: [facility]  Address: [address]  [facility][phone]  [phone]    Dialysis Facility (if applicable)   Name:  Address:  Dialysis Schedule:  Phone:  Fax:    / signature: Electronically signed by NIMISHA Lowry on 3/6/23 at 10:26 AM EST    PHYSICIAN SECTION    Prognosis: Fair    Condition at Discharge: Stable    Rehab Potential (if transferring to Rehab): Fair    Recommended Labs or Other Treatments After Discharge: cbc / diff, bmp in 1 week    Physician Certification: I certify the above information and transfer of Jesús Roman  is necessary for the continuing treatment of the diagnosis listed and that she requires [facility] for less 30 days.      Update Admission H&P: No change in H&P    PHYSICIAN SIGNATURE:  Electronically signed by PONCE Chávez CNP on 3/6/23 at 11:23 AM EST

## 2023-03-06 NOTE — PROGRESS NOTES
Occupational Therapy  Facility/Department: [facility] THE [facility] MED SURG  Daily Treatment Note  NAME: Alondra Adams  : 1935  MRN: [id number]    Date of Service: 3/6/2023    Discharge Recommendations:  [address]         Patient Diagnosis(es): The primary encounter diagnosis was Elevated troponin. Diagnoses of General weakness and Hyponatremia were also pertinent to this visit. Assessment    Activity Tolerance: Patient limited by fatigue;Patient limited by endurance  Discharge Recommendations: Subacute/Skilled Nursing Facility      Plan   Occupational Therapy Plan  Times Per Week: Daily  Current Treatment Recommendations: Self-Care / ADL; Strengthening; Endurance training;Functional mobility training     Restrictions  Restrictions/Precautions  Restrictions/Precautions: General Precautions; Fall Risk;Weight Bearing;Contact Precautions  Lower Extremity Weight Bearing Restrictions  Right Lower Extremity Weight Bearing: Weight Bearing As Tolerated    Subjective   Subjective  Subjective: Pt lying in bed upon arrival. Pt agreed to participate in therapy session. Pain: Pt had no complaints of pain this date. Pain: Pt denies           Objective    Vitals       OT Exercises  Exercise Treatment: Pt tolerated BUE ther ex x 5 planes x 15 reps x 1 set to increase UE strength and endurance in order to ease completion of ADL tasks. Pt required RBs as needed secondary to fatigue. Safety Devices  Type of Devices: Call light within reach;Nurse notified; Left in bed;Bed alarm in place     Patient Education  Education Given To: Patient  Education Provided: Role of Therapy;Plan of Care  Education Method: Verbal  Barriers to Learning: None  Education Outcome: Verbalized understanding    Goals  Short Term Goals  Time Frame for Short Term Goals: 1 week  Short Term Goal 1: Pt. will tolerate 15-30 min. functional activity to improve strength for ADL's.   Short Term Goal 2: Pt. will engage in self care to increase Ind.  Short Term Goal 3: Pt. will state understanding to safety as relates to ADL's. Long Term Goals  Time Frame for Long Term Goals : 4 weeks  Long Term Goal 1: Pt. will return to PTA ADL status.   Patient Goals   Patient goals : Return to rehab       Therapy Time   Individual Concurrent Group Co-treatment   Time In 0959         Time Out 1023         Minutes 24                 AMRIT Jama/TAMAR

## 2023-03-13 ENCOUNTER — HOSPITAL ENCOUNTER (OUTPATIENT)
Age: 88
Setting detail: SPECIMEN
Discharge: HOME | End: 2023-03-13

## 2023-03-13 LAB
ABSOLUTE EOS #: 0.11 K/UL (ref 0–0.44)
ABSOLUTE IMMATURE GRANULOCYTE: 0.03 K/UL (ref 0–0.3)
ABSOLUTE LYMPH #: 0.98 K/UL (ref 1.1–3.7)
ABSOLUTE MONO #: 0.4 K/UL (ref 0.1–1.2)
ALBUMIN SERPL-MCNC: 3.3 G/DL (ref 3.5–5.2)
ALBUMIN/GLOBULIN RATIO: 1.5 (ref 1–2.5)
ALP SERPL-CCNC: 111 U/L (ref 35–104)
ALT SERPL-CCNC: 12 U/L (ref 5–33)
ANION GAP SERPL CALCULATED.3IONS-SCNC: 5 MMOL/L (ref 9–17)
AST SERPL-CCNC: 14 U/L
BASOPHILS # BLD: 1 % (ref 0–2)
BASOPHILS ABSOLUTE: 0.03 K/UL (ref 0–0.2)
BILIRUB SERPL-MCNC: 0.5 MG/DL (ref 0.3–1.2)
BUN SERPL-MCNC: 20 MG/DL (ref 8–23)
BUN/CREAT BLD: 29 (ref 9–20)
CALCIUM SERPL-MCNC: 9.2 MG/DL (ref 8.6–10.4)
CHLORIDE SERPL-SCNC: 97 MMOL/L (ref 98–107)
CO2 SERPL-SCNC: 30 MMOL/L (ref 20–31)
CREAT SERPL-MCNC: 0.69 MG/DL (ref 0.5–0.9)
EOSINOPHILS RELATIVE PERCENT: 3 % (ref 1–4)
GFR SERPL CREATININE-BSD FRML MDRD: >60 ML/MIN/1.73M2
GLUCOSE SERPL-MCNC: 118 MG/DL (ref 70–99)
HCT VFR BLD AUTO: 30.3 % (ref 36.3–47.1)
HGB BLD-MCNC: 9.5 G/DL (ref 11.9–15.1)
IMMATURE GRANULOCYTES: 1 %
LYMPHOCYTES # BLD: 23 % (ref 24–43)
MCH RBC QN AUTO: 30.2 PG (ref 25.2–33.5)
MCHC RBC AUTO-ENTMCNC: 31.4 G/DL (ref 28.4–34.8)
MCV RBC AUTO: 96.2 FL (ref 82.6–102.9)
MONOCYTES # BLD: 10 % (ref 3–12)
NRBC AUTOMATED: 0 PER 100 WBC
PDW BLD-RTO: 14.3 % (ref 11.8–14.4)
PLATELET # BLD AUTO: 158 K/UL (ref 138–453)
PMV BLD AUTO: 10.3 FL (ref 8.1–13.5)
POTASSIUM SERPL-SCNC: 4.4 MMOL/L (ref 3.7–5.3)
PROT SERPL-MCNC: 5.5 G/DL (ref 6.4–8.3)
RBC # BLD: 3.15 M/UL (ref 3.95–5.11)
SEG NEUTROPHILS: 62 % (ref 36–65)
SEGMENTED NEUTROPHILS ABSOLUTE COUNT: 2.68 K/UL (ref 1.5–8.1)
SODIUM SERPL-SCNC: 132 MMOL/L (ref 135–144)
WBC # BLD AUTO: 4.2 K/UL (ref 3.5–11.3)

## 2023-03-13 PROCEDURE — 36415 COLL VENOUS BLD VENIPUNCTURE: CPT

## 2023-03-13 PROCEDURE — P9603 ONE-WAY ALLOW PRORATED MILES: HCPCS

## 2023-03-13 PROCEDURE — 80053 COMPREHEN METABOLIC PANEL: CPT

## 2023-03-13 PROCEDURE — 85025 COMPLETE CBC W/AUTO DIFF WBC: CPT

## 2023-03-20 ENCOUNTER — HOSPITAL ENCOUNTER (OUTPATIENT)
Age: 88
Setting detail: SPECIMEN
Discharge: HOME | End: 2023-03-20

## 2023-03-20 LAB
ABSOLUTE EOS #: 0.15 K/UL (ref 0–0.44)
ABSOLUTE IMMATURE GRANULOCYTE: 0.05 K/UL (ref 0–0.3)
ABSOLUTE LYMPH #: 1.2 K/UL (ref 1.1–3.7)
ABSOLUTE MONO #: 0.48 K/UL (ref 0.1–1.2)
ALBUMIN SERPL-MCNC: 3.2 G/DL (ref 3.5–5.2)
ALBUMIN/GLOBULIN RATIO: 1.5 (ref 1–2.5)
ALP SERPL-CCNC: 121 U/L (ref 35–104)
ALT SERPL-CCNC: 6 U/L (ref 5–33)
ANION GAP SERPL CALCULATED.3IONS-SCNC: 7 MMOL/L (ref 9–17)
AST SERPL-CCNC: 15 U/L
BASOPHILS # BLD: 1 % (ref 0–2)
BASOPHILS ABSOLUTE: 0.05 K/UL (ref 0–0.2)
BILIRUB SERPL-MCNC: 0.3 MG/DL (ref 0.3–1.2)
BUN SERPL-MCNC: 18 MG/DL (ref 8–23)
BUN/CREAT BLD: 30 (ref 9–20)
CALCIUM SERPL-MCNC: 9.2 MG/DL (ref 8.6–10.4)
CHLORIDE SERPL-SCNC: 99 MMOL/L (ref 98–107)
CO2 SERPL-SCNC: 29 MMOL/L (ref 20–31)
CREAT SERPL-MCNC: 0.6 MG/DL (ref 0.5–0.9)
EOSINOPHILS RELATIVE PERCENT: 3 % (ref 1–4)
GFR SERPL CREATININE-BSD FRML MDRD: >60 ML/MIN/1.73M2
GLUCOSE SERPL-MCNC: 103 MG/DL (ref 70–99)
HCT VFR BLD AUTO: 33.5 % (ref 36.3–47.1)
HGB BLD-MCNC: 10.1 G/DL (ref 11.9–15.1)
IMMATURE GRANULOCYTES: 1 %
LYMPHOCYTES # BLD: 28 % (ref 24–43)
MCH RBC QN AUTO: 28.9 PG (ref 25.2–33.5)
MCHC RBC AUTO-ENTMCNC: 30.1 G/DL (ref 28.4–34.8)
MCV RBC AUTO: 95.7 FL (ref 82.6–102.9)
MONOCYTES # BLD: 11 % (ref 3–12)
NRBC AUTOMATED: 0 PER 100 WBC
PDW BLD-RTO: 14.3 % (ref 11.8–14.4)
PLATELET # BLD AUTO: 222 K/UL (ref 138–453)
PMV BLD AUTO: 10.9 FL (ref 8.1–13.5)
POTASSIUM SERPL-SCNC: 4.4 MMOL/L (ref 3.7–5.3)
PROT SERPL-MCNC: 5.4 G/DL (ref 6.4–8.3)
RBC # BLD: 3.5 M/UL (ref 3.95–5.11)
SEG NEUTROPHILS: 56 % (ref 36–65)
SEGMENTED NEUTROPHILS ABSOLUTE COUNT: 2.42 K/UL (ref 1.5–8.1)
SODIUM SERPL-SCNC: 135 MMOL/L (ref 135–144)
WBC # BLD AUTO: 4.4 K/UL (ref 3.5–11.3)

## 2023-03-20 PROCEDURE — 85025 COMPLETE CBC W/AUTO DIFF WBC: CPT

## 2023-03-20 PROCEDURE — 36415 COLL VENOUS BLD VENIPUNCTURE: CPT

## 2023-03-20 PROCEDURE — 80053 COMPREHEN METABOLIC PANEL: CPT

## 2023-03-20 PROCEDURE — P9604 ONE-WAY ALLOW PRORATED TRIP: HCPCS

## 2023-03-27 ENCOUNTER — HOSPITAL ENCOUNTER (OUTPATIENT)
Age: 88
Setting detail: SPECIMEN
Discharge: HOME | End: 2023-03-27

## 2023-03-27 LAB
ABSOLUTE EOS #: 0.09 K/UL (ref 0–0.44)
ABSOLUTE IMMATURE GRANULOCYTE: 0.03 K/UL (ref 0–0.3)
ABSOLUTE LYMPH #: 1.63 K/UL (ref 1.1–3.7)
ABSOLUTE MONO #: 0.37 K/UL (ref 0.1–1.2)
ALBUMIN SERPL-MCNC: 3.4 G/DL (ref 3.5–5.2)
ALBUMIN/GLOBULIN RATIO: 1.5 (ref 1–2.5)
ALP SERPL-CCNC: 136 U/L (ref 35–104)
ALT SERPL-CCNC: <5 U/L (ref 5–33)
ANION GAP SERPL CALCULATED.3IONS-SCNC: 5 MMOL/L (ref 9–17)
AST SERPL-CCNC: 12 U/L
BASOPHILS # BLD: 1 % (ref 0–2)
BASOPHILS ABSOLUTE: 0.05 K/UL (ref 0–0.2)
BILIRUB SERPL-MCNC: 0.3 MG/DL (ref 0.3–1.2)
BUN SERPL-MCNC: 30 MG/DL (ref 8–23)
BUN/CREAT BLD: 33 (ref 9–20)
CALCIUM SERPL-MCNC: 9.9 MG/DL (ref 8.6–10.4)
CHLORIDE SERPL-SCNC: 101 MMOL/L (ref 98–107)
CO2 SERPL-SCNC: 30 MMOL/L (ref 20–31)
CREAT SERPL-MCNC: 0.9 MG/DL (ref 0.5–0.9)
EOSINOPHILS RELATIVE PERCENT: 2 % (ref 1–4)
GFR SERPL CREATININE-BSD FRML MDRD: >60 ML/MIN/1.73M2
GLUCOSE SERPL-MCNC: 96 MG/DL (ref 70–99)
HCT VFR BLD AUTO: 33.5 % (ref 36.3–47.1)
HGB BLD-MCNC: 10.3 G/DL (ref 11.9–15.1)
IMMATURE GRANULOCYTES: 1 %
LYMPHOCYTES # BLD: 32 % (ref 24–43)
MCH RBC QN AUTO: 28.9 PG (ref 25.2–33.5)
MCHC RBC AUTO-ENTMCNC: 30.7 G/DL (ref 28.4–34.8)
MCV RBC AUTO: 94.1 FL (ref 82.6–102.9)
MONOCYTES # BLD: 7 % (ref 3–12)
NRBC AUTOMATED: 0 PER 100 WBC
PDW BLD-RTO: 13.8 % (ref 11.8–14.4)
PLATELET # BLD AUTO: 237 K/UL (ref 138–453)
PMV BLD AUTO: 11.4 FL (ref 8.1–13.5)
POTASSIUM SERPL-SCNC: 4.4 MMOL/L (ref 3.7–5.3)
PROT SERPL-MCNC: 5.6 G/DL (ref 6.4–8.3)
RBC # BLD: 3.56 M/UL (ref 3.95–5.11)
SEG NEUTROPHILS: 57 % (ref 36–65)
SEGMENTED NEUTROPHILS ABSOLUTE COUNT: 2.91 K/UL (ref 1.5–8.1)
SODIUM SERPL-SCNC: 136 MMOL/L (ref 135–144)
WBC # BLD AUTO: 5.1 K/UL (ref 3.5–11.3)

## 2023-03-27 PROCEDURE — 85025 COMPLETE CBC W/AUTO DIFF WBC: CPT

## 2023-03-27 PROCEDURE — 36415 COLL VENOUS BLD VENIPUNCTURE: CPT

## 2023-03-27 PROCEDURE — 80053 COMPREHEN METABOLIC PANEL: CPT

## 2023-03-27 PROCEDURE — P9604 ONE-WAY ALLOW PRORATED TRIP: HCPCS

## 2023-03-31 NOTE — PLAN OF CARE
Problem: Discharge Planning  Goal: Discharge to home or other facility with appropriate resources  Outcome: Progressing  Flowsheets (Taken 3/3/2023 2248)  Discharge to home or other facility with appropriate resources: Identify barriers to discharge with patient and caregiver     Problem: Safety - Adult  Goal: Free from fall injury  Outcome: Progressing  Flowsheets (Taken 3/3/2023 2248)  Free From Fall Injury: Instruct family/caregiver on patient safety     Problem: ABCDS Injury Assessment  Goal: Absence of physical injury  Outcome: Progressing  Flowsheets (Taken 3/3/2023 2248)  Absence of Physical Injury: Implement safety measures based on patient assessment     Problem: Skin/Tissue Integrity  Goal: Absence of new skin breakdown  Description: 1. Monitor for areas of redness and/or skin breakdown  2. Assess vascular access sites hourly  3. Every 4-6 hours minimum:  Change oxygen saturation probe site  4. Every 4-6 hours:  If on nasal continuous positive airway pressure, respiratory therapy assess nares and determine need for appliance change or resting period. Outcome: Progressing  Note: No new skin issues.      Problem: Pain  Goal: Verbalizes/displays adequate comfort level or baseline comfort level  Outcome: Progressing  Flowsheets (Taken 3/3/2023 2248)  Verbalizes/displays adequate comfort level or baseline comfort level:   Encourage patient to monitor pain and request assistance   Administer analgesics based on type and severity of pain and evaluate response   Assess pain using appropriate pain scale   Implement non-pharmacological measures as appropriate and evaluate response   Consider cultural and social influences on pain and pain management   Notify Licensed Independent Practitioner if interventions unsuccessful or patient reports new pain none

## 2023-04-03 ENCOUNTER — HOSPITAL ENCOUNTER (OUTPATIENT)
Age: 88
Setting detail: SPECIMEN
Discharge: HOME | End: 2023-04-03

## 2023-04-03 LAB
ABSOLUTE EOS #: 0.12 K/UL (ref 0–0.44)
ABSOLUTE IMMATURE GRANULOCYTE: 0.03 K/UL (ref 0–0.3)
ABSOLUTE LYMPH #: 1.77 K/UL (ref 1.1–3.7)
ABSOLUTE MONO #: 0.41 K/UL (ref 0.1–1.2)
ALBUMIN SERPL-MCNC: 3.4 G/DL (ref 3.5–5.2)
ALBUMIN/GLOBULIN RATIO: 1.6 (ref 1–2.5)
ALP SERPL-CCNC: 147 U/L (ref 35–104)
ALT SERPL-CCNC: <5 U/L (ref 5–33)
ANION GAP SERPL CALCULATED.3IONS-SCNC: 7 MMOL/L (ref 9–17)
AST SERPL-CCNC: 13 U/L
BASOPHILS # BLD: 1 % (ref 0–2)
BASOPHILS ABSOLUTE: 0.03 K/UL (ref 0–0.2)
BILIRUB SERPL-MCNC: 0.3 MG/DL (ref 0.3–1.2)
BUN SERPL-MCNC: 32 MG/DL (ref 8–23)
BUN/CREAT BLD: 33 (ref 9–20)
CALCIUM SERPL-MCNC: 10.3 MG/DL (ref 8.6–10.4)
CHLORIDE SERPL-SCNC: 101 MMOL/L (ref 98–107)
CO2 SERPL-SCNC: 30 MMOL/L (ref 20–31)
CREAT SERPL-MCNC: 0.96 MG/DL (ref 0.5–0.9)
EOSINOPHILS RELATIVE PERCENT: 3 % (ref 1–4)
GFR SERPL CREATININE-BSD FRML MDRD: 58 ML/MIN/1.73M2
GLUCOSE SERPL-MCNC: 90 MG/DL (ref 70–99)
HCT VFR BLD AUTO: 32.9 % (ref 36.3–47.1)
HGB BLD-MCNC: 10 G/DL (ref 11.9–15.1)
IMMATURE GRANULOCYTES: 1 %
LYMPHOCYTES # BLD: 38 % (ref 24–43)
MCH RBC QN AUTO: 28.5 PG (ref 25.2–33.5)
MCHC RBC AUTO-ENTMCNC: 30.4 G/DL (ref 28.4–34.8)
MCV RBC AUTO: 93.7 FL (ref 82.6–102.9)
MONOCYTES # BLD: 9 % (ref 3–12)
NRBC AUTOMATED: 0 PER 100 WBC
PDW BLD-RTO: 13.8 % (ref 11.8–14.4)
PLATELET # BLD AUTO: 182 K/UL (ref 138–453)
PMV BLD AUTO: 11.6 FL (ref 8.1–13.5)
POTASSIUM SERPL-SCNC: 4.2 MMOL/L (ref 3.7–5.3)
PROT SERPL-MCNC: 5.5 G/DL (ref 6.4–8.3)
RBC # BLD: 3.51 M/UL (ref 3.95–5.11)
SEG NEUTROPHILS: 48 % (ref 36–65)
SEGMENTED NEUTROPHILS ABSOLUTE COUNT: 2.31 K/UL (ref 1.5–8.1)
SODIUM SERPL-SCNC: 138 MMOL/L (ref 135–144)
WBC # BLD AUTO: 4.7 K/UL (ref 3.5–11.3)

## 2023-04-03 PROCEDURE — 85025 COMPLETE CBC W/AUTO DIFF WBC: CPT

## 2023-04-03 PROCEDURE — 80053 COMPREHEN METABOLIC PANEL: CPT

## 2023-04-03 PROCEDURE — P9604 ONE-WAY ALLOW PRORATED TRIP: HCPCS

## 2023-04-03 PROCEDURE — 36415 COLL VENOUS BLD VENIPUNCTURE: CPT

## 2023-04-17 ENCOUNTER — HOSPITAL ENCOUNTER (OUTPATIENT)
Age: 88
Setting detail: SPECIMEN
Discharge: HOME | End: 2023-04-17
Payer: MEDICARE

## 2023-04-17 LAB
ABSOLUTE EOS #: 0.13 K/UL (ref 0–0.44)
ABSOLUTE IMMATURE GRANULOCYTE: <0.03 K/UL (ref 0–0.3)
ABSOLUTE LYMPH #: 1.44 K/UL (ref 1.1–3.7)
ABSOLUTE MONO #: 0.29 K/UL (ref 0.1–1.2)
ALBUMIN SERPL-MCNC: 3.4 G/DL (ref 3.5–5.2)
ALBUMIN/GLOBULIN RATIO: 1.6 (ref 1–2.5)
ALP SERPL-CCNC: 191 U/L (ref 35–104)
ALT SERPL-CCNC: <5 U/L (ref 5–33)
ANION GAP SERPL CALCULATED.3IONS-SCNC: 6 MMOL/L (ref 9–17)
AST SERPL-CCNC: 9 U/L
BASOPHILS # BLD: 1 % (ref 0–2)
BASOPHILS ABSOLUTE: 0.03 K/UL (ref 0–0.2)
BILIRUB SERPL-MCNC: 0.2 MG/DL (ref 0.3–1.2)
BUN SERPL-MCNC: 23 MG/DL (ref 8–23)
BUN/CREAT BLD: 26 (ref 9–20)
CALCIUM SERPL-MCNC: 10.6 MG/DL (ref 8.6–10.4)
CHLORIDE SERPL-SCNC: 105 MMOL/L (ref 98–107)
CO2 SERPL-SCNC: 27 MMOL/L (ref 20–31)
CREAT SERPL-MCNC: 0.87 MG/DL (ref 0.5–0.9)
EOSINOPHILS RELATIVE PERCENT: 3 % (ref 1–4)
GFR SERPL CREATININE-BSD FRML MDRD: >60 ML/MIN/1.73M2
GLUCOSE SERPL-MCNC: 104 MG/DL (ref 70–99)
HCT VFR BLD AUTO: 31.7 % (ref 36.3–47.1)
HGB BLD-MCNC: 9.9 G/DL (ref 11.9–15.1)
IMMATURE GRANULOCYTES: 1 %
LYMPHOCYTES # BLD: 35 % (ref 24–43)
MCH RBC QN AUTO: 28.5 PG (ref 25.2–33.5)
MCHC RBC AUTO-ENTMCNC: 31.2 G/DL (ref 28.4–34.8)
MCV RBC AUTO: 91.4 FL (ref 82.6–102.9)
MONOCYTES # BLD: 7 % (ref 3–12)
NRBC AUTOMATED: 0 PER 100 WBC
PDW BLD-RTO: 13.5 % (ref 11.8–14.4)
PLATELET # BLD AUTO: 189 K/UL (ref 138–453)
PMV BLD AUTO: 11.5 FL (ref 8.1–13.5)
POTASSIUM SERPL-SCNC: 3.9 MMOL/L (ref 3.7–5.3)
PROT SERPL-MCNC: 5.5 G/DL (ref 6.4–8.3)
RBC # BLD: 3.47 M/UL (ref 3.95–5.11)
SEG NEUTROPHILS: 53 % (ref 36–65)
SEGMENTED NEUTROPHILS ABSOLUTE COUNT: 2.24 K/UL (ref 1.5–8.1)
SODIUM SERPL-SCNC: 138 MMOL/L (ref 135–144)
WBC # BLD AUTO: 4.2 K/UL (ref 3.5–11.3)

## 2023-04-17 PROCEDURE — 85025 COMPLETE CBC W/AUTO DIFF WBC: CPT

## 2023-04-17 PROCEDURE — 36415 COLL VENOUS BLD VENIPUNCTURE: CPT

## 2023-04-17 PROCEDURE — 80053 COMPREHEN METABOLIC PANEL: CPT

## 2023-04-27 ENCOUNTER — HOSPITAL ENCOUNTER (OUTPATIENT)
Age: 88
Discharge: HOME | End: 2023-04-27
Payer: MEDICARE

## 2023-04-27 ENCOUNTER — HOSPITAL ENCOUNTER (OUTPATIENT)
Dept: INFUSION THERAPY | Age: 88
Discharge: HOME | End: 2023-04-27
Payer: MEDICARE

## 2023-04-27 VITALS
DIASTOLIC BLOOD PRESSURE: 73 MMHG | TEMPERATURE: 98.2 F | SYSTOLIC BLOOD PRESSURE: 109 MMHG | HEART RATE: 72 BPM | RESPIRATION RATE: 18 BRPM | OXYGEN SATURATION: 98 %

## 2023-04-27 PROCEDURE — 2580000003 HC RX 258: Performed by: PHYSICIAN ASSISTANT

## 2023-04-27 PROCEDURE — 6360000002 HC RX W HCPCS: Performed by: PHYSICIAN ASSISTANT

## 2023-04-27 RX ORDER — AMPICILLIN AND SULBACTAM 2; 1 G/1; G/1
INJECTION, POWDER, FOR SOLUTION INTRAMUSCULAR; INTRAVENOUS
Status: DISCONTINUED
Start: 2023-04-27 | End: 2023-04-28 | Stop reason: HOSPADM

## 2023-04-27 RX ORDER — SODIUM CHLORIDE 9 MG/ML
INJECTION, SOLUTION INTRAVENOUS
Status: DISCONTINUED
Start: 2023-04-27 | End: 2023-04-28 | Stop reason: HOSPADM

## 2023-04-27 RX ADMIN — SODIUM CHLORIDE 3000 MG: 9 INJECTION, SOLUTION INTRAVENOUS at 17:49

## 2023-05-03 ENCOUNTER — APPOINTMENT (OUTPATIENT)
Dept: CT IMAGING | Age: 88
End: 2023-05-03
Payer: MEDICARE

## 2023-05-03 ENCOUNTER — HOSPITAL ENCOUNTER (EMERGENCY)
Age: 88
Discharge: HOME | End: 2023-05-03
Payer: MEDICARE

## 2023-05-03 ENCOUNTER — APPOINTMENT (OUTPATIENT)
Dept: GENERAL RADIOLOGY | Age: 88
End: 2023-05-03
Payer: MEDICARE

## 2023-05-03 VITALS
RESPIRATION RATE: 18 BRPM | SYSTOLIC BLOOD PRESSURE: 149 MMHG | BODY MASS INDEX: 17.48 KG/M2 | OXYGEN SATURATION: 98 % | TEMPERATURE: 98.2 F | HEART RATE: 90 BPM | WEIGHT: 88 LBS | DIASTOLIC BLOOD PRESSURE: 67 MMHG

## 2023-05-03 DIAGNOSIS — R11.0 NAUSEA: Primary | ICD-10-CM

## 2023-05-03 LAB
ABSOLUTE EOS #: 0.06 K/UL (ref 0–0.44)
ABSOLUTE IMMATURE GRANULOCYTE: 0.03 K/UL (ref 0–0.3)
ABSOLUTE LYMPH #: 1 K/UL (ref 1.1–3.7)
ABSOLUTE MONO #: 0.4 K/UL (ref 0.1–1.2)
ALBUMIN SERPL-MCNC: 4 G/DL (ref 3.5–5.2)
ALBUMIN/GLOBULIN RATIO: 1.4 (ref 1–2.5)
ALP SERPL-CCNC: 232 U/L (ref 35–104)
ALT SERPL-CCNC: <5 U/L (ref 5–33)
ANION GAP SERPL CALCULATED.3IONS-SCNC: 15 MMOL/L (ref 9–17)
AST SERPL-CCNC: 15 U/L
BACTERIA: ABNORMAL
BASOPHILS # BLD: 1 % (ref 0–2)
BASOPHILS ABSOLUTE: 0.06 K/UL (ref 0–0.2)
BILIRUB SERPL-MCNC: <0.1 MG/DL (ref 0.3–1.2)
BILIRUBIN URINE: NEGATIVE
BUN SERPL-MCNC: 11 MG/DL (ref 8–23)
BUN/CREAT BLD: 13 (ref 9–20)
CALCIUM SERPL-MCNC: 11.3 MG/DL (ref 8.6–10.4)
CHLORIDE SERPL-SCNC: 107 MMOL/L (ref 98–107)
CO2 SERPL-SCNC: 20 MMOL/L (ref 20–31)
COLOR: YELLOW
CREAT SERPL-MCNC: 0.83 MG/DL (ref 0.5–0.9)
CRYSTALS, UA: ABNORMAL /HPF
EOSINOPHILS RELATIVE PERCENT: 1 % (ref 1–4)
EPITHELIAL CELLS UA: ABNORMAL /HPF (ref 0–25)
GFR SERPL CREATININE-BSD FRML MDRD: >60 ML/MIN/1.73M2
GLUCOSE SERPL-MCNC: 110 MG/DL (ref 70–99)
GLUCOSE UR STRIP.AUTO-MCNC: NEGATIVE MG/DL
HCT VFR BLD AUTO: 36.6 % (ref 36.3–47.1)
HGB BLD-MCNC: 11.1 G/DL (ref 11.9–15.1)
IMMATURE GRANULOCYTES: 0 %
KETONES UR STRIP.AUTO-MCNC: NEGATIVE MG/DL
LEUKOCYTE ESTERASE UR QL STRIP.AUTO: ABNORMAL
LIPASE SERPL-CCNC: 19 U/L (ref 13–60)
LYMPHOCYTES # BLD: 13 % (ref 24–43)
MCH RBC QN AUTO: 28.6 PG (ref 25.2–33.5)
MCHC RBC AUTO-ENTMCNC: 30.3 G/DL (ref 28.4–34.8)
MCV RBC AUTO: 94.3 FL (ref 82.6–102.9)
MONOCYTES # BLD: 5 % (ref 3–12)
NITRITE UR QL STRIP.AUTO: NEGATIVE
NRBC AUTOMATED: 0 PER 100 WBC
PDW BLD-RTO: 14 % (ref 11.8–14.4)
PLATELET # BLD AUTO: 255 K/UL (ref 138–453)
PMV BLD AUTO: 11.9 FL (ref 8.1–13.5)
POTASSIUM SERPL-SCNC: 4.5 MMOL/L (ref 3.7–5.3)
PROT SERPL-MCNC: 6.8 G/DL (ref 6.4–8.3)
PROT UR STRIP.AUTO-MCNC: 5.5 MG/DL (ref 5–9)
PROT UR STRIP.AUTO-MCNC: NEGATIVE MG/DL
RBC # BLD: 3.88 M/UL (ref 3.95–5.11)
RBC CLUMPS #/AREA URNS AUTO: ABNORMAL /HPF (ref 0–2)
SEG NEUTROPHILS: 80 % (ref 36–65)
SEGMENTED NEUTROPHILS ABSOLUTE COUNT: 6.11 K/UL (ref 1.5–8.1)
SODIUM SERPL-SCNC: 142 MMOL/L (ref 135–144)
SPECIFIC GRAVITY UA: 1.02 (ref 1.01–1.02)
TROPONIN I SERPL DL<=0.01 NG/ML-MCNC: 41 NG/L (ref 0–14)
TURBIDITY: CLEAR
URINE HGB: NEGATIVE
UROBILINOGEN, URINE: NORMAL
WBC # BLD AUTO: 7.7 K/UL (ref 3.5–11.3)
WBC UA: ABNORMAL /HPF (ref 0–5)

## 2023-05-03 PROCEDURE — 80053 COMPREHEN METABOLIC PANEL: CPT

## 2023-05-03 PROCEDURE — 74177 CT ABD & PELVIS W/CONTRAST: CPT

## 2023-05-03 PROCEDURE — 96374 THER/PROPH/DIAG INJ IV PUSH: CPT

## 2023-05-03 PROCEDURE — 71046 X-RAY EXAM CHEST 2 VIEWS: CPT

## 2023-05-03 PROCEDURE — 6360000004 HC RX CONTRAST MEDICATION: Performed by: PHYSICIAN ASSISTANT

## 2023-05-03 PROCEDURE — 84484 ASSAY OF TROPONIN QUANT: CPT

## 2023-05-03 PROCEDURE — 83690 ASSAY OF LIPASE: CPT

## 2023-05-03 PROCEDURE — 85025 COMPLETE CBC W/AUTO DIFF WBC: CPT

## 2023-05-03 PROCEDURE — 81001 URINALYSIS AUTO W/SCOPE: CPT

## 2023-05-03 PROCEDURE — 6360000002 HC RX W HCPCS: Performed by: PHYSICIAN ASSISTANT

## 2023-05-03 PROCEDURE — 93005 ELECTROCARDIOGRAM TRACING: CPT | Performed by: PHYSICIAN ASSISTANT

## 2023-05-03 PROCEDURE — 2580000003 HC RX 258: Performed by: PHYSICIAN ASSISTANT

## 2023-05-03 PROCEDURE — 99285 EMERGENCY DEPT VISIT HI MDM: CPT

## 2023-05-03 PROCEDURE — 36415 COLL VENOUS BLD VENIPUNCTURE: CPT

## 2023-05-03 RX ORDER — OMEPRAZOLE 20 MG/1
40 CAPSULE, DELAYED RELEASE ORAL DAILY
COMMUNITY

## 2023-05-03 RX ORDER — 0.9 % SODIUM CHLORIDE 0.9 %
1000 INTRAVENOUS SOLUTION INTRAVENOUS ONCE
Status: COMPLETED | OUTPATIENT
Start: 2023-05-03 | End: 2023-05-03

## 2023-05-03 RX ORDER — DOXYCYCLINE HYCLATE 100 MG/1
100 CAPSULE ORAL 2 TIMES DAILY
COMMUNITY

## 2023-05-03 RX ORDER — ONDANSETRON 2 MG/ML
4 INJECTION INTRAMUSCULAR; INTRAVENOUS ONCE
Status: COMPLETED | OUTPATIENT
Start: 2023-05-03 | End: 2023-05-03

## 2023-05-03 RX ORDER — AMOXICILLIN AND CLAVULANATE POTASSIUM 500; 125 MG/1; MG/1
1 TABLET, FILM COATED ORAL 3 TIMES DAILY
COMMUNITY

## 2023-05-03 RX ADMIN — IOPAMIDOL 75 ML: 755 INJECTION, SOLUTION INTRAVENOUS at 14:04

## 2023-05-03 RX ADMIN — ONDANSETRON 4 MG: 2 INJECTION INTRAMUSCULAR; INTRAVENOUS at 13:32

## 2023-05-03 RX ADMIN — SODIUM CHLORIDE 1000 ML: 9 INJECTION, SOLUTION INTRAVENOUS at 13:30

## 2023-05-03 ASSESSMENT — PAIN SCALES - GENERAL: PAINLEVEL_OUTOF10: 4

## 2023-05-03 ASSESSMENT — PAIN - FUNCTIONAL ASSESSMENT: PAIN_FUNCTIONAL_ASSESSMENT: 0-10

## 2023-05-03 ASSESSMENT — LIFESTYLE VARIABLES: HOW OFTEN DO YOU HAVE A DRINK CONTAINING ALCOHOL: NEVER

## 2023-05-03 ASSESSMENT — ENCOUNTER SYMPTOMS
RESPIRATORY NEGATIVE: 1
NAUSEA: 1

## 2023-05-03 ASSESSMENT — PAIN DESCRIPTION - DESCRIPTORS: DESCRIPTORS: ACHING

## 2023-05-03 ASSESSMENT — PAIN DESCRIPTION - LOCATION: LOCATION: ABDOMEN

## 2023-05-03 NOTE — PLAN OF CARE
Assisted pt from car to wheelchair and to room 6. Pt sent here from Dr Padmini Harden office for dehydration. IV and labs collectd.

## 2023-05-03 NOTE — DISCHARGE INSTRUCTIONS
Follow-up with primary care doctor in 3 to 5 days for reevaluation. Continue to drink plenty of fluids as tolerated, use your walker, continue home medications as prescribed. Promptly return to emergency department for new, changing, worsening of symptoms or other concerns.

## 2023-05-03 NOTE — ED NOTES
Pt ambulated in department with walker. Pt tolerated well. Provider aware.  Pt dressed at this time     Jyothi Santacruz RN  05/03/23 2426

## 2023-05-03 NOTE — ED NOTES
ORTHO B/P    LAYING:   HR 93  B/P 140/78      SITTING:    B/P 157/87    STANDING:    B/P 128/78     Francine Beltran RN  05/03/23 1220

## 2023-05-03 NOTE — ED PROVIDER NOTES
abnormality. Patient reevaluated 1510 hrs. patient p.o. challenged no vomiting appreciated patient states she feels much better. 1551 hrs. continues to states she feels better and wants to go home, nursing ambulated patient with walker which she walks with at home no symptoms reported while ambulating in the emergency department. Strict return precautions discussed with patient demonstrates good insight to symptoms and is in agreement plan to return to emergency department should her symptoms worsen. Discussed case with ER attending prior to disposition who is in agreement with plan. CRITICAL CARE TIME   Total Critical Care time was  minutes, excluding separately reportable procedures. There was a high probability of clinically significant/life threatening deterioration in the patient's condition which required my urgent intervention. CONSULTS:  None    PROCEDURES:  Unless otherwise noted below, none     Procedures        FINAL IMPRESSION      1. Nausea Stable         DISPOSITION/PLAN   DISPOSITION Decision To Discharge 05/03/2023 03:57:30 PM      PATIENT REFERRED TO:  Haim Lee [address] [place], [place] [id number] [phone]    Schedule an appointment as soon as possible for a visit in 3 days        DISCHARGE MEDICATIONS:  New Prescriptions    No medications on file     Controlled Substances Monitoring:     No flowsheet data found.     (Please note that portions of this note were completed with a voice recognition program.  Efforts were made to edit the dictations but occasionally words are mis-transcribed.)    Allison Santoyo PA-C (electronically signed)           Allison Santoyo PA-C  05/03/23 7223

## 2023-05-04 LAB
EKG ATRIAL RATE: 92 BPM
EKG P AXIS: 51 DEGREES
EKG P-R INTERVAL: 142 MS
EKG Q-T INTERVAL: 340 MS
EKG QRS DURATION: 74 MS
EKG QTC CALCULATION (BAZETT): 420 MS
EKG R AXIS: 6 DEGREES
EKG T AXIS: 51 DEGREES
EKG VENTRICULAR RATE: 92 BPM

## 2023-05-04 PROCEDURE — 93010 ELECTROCARDIOGRAM REPORT: CPT | Performed by: INTERNAL MEDICINE

## 2024-09-14 PROBLEM — U07.1 COVID: Status: ACTIVE | Noted: 2024-09-14

## 2025-02-19 PROBLEM — N20.1 URETERAL CALCULUS: Status: ACTIVE | Noted: 2025-02-19

## 2025-04-07 ENCOUNTER — RESULTS FOLLOW-UP (OUTPATIENT)
Dept: UROLOGY | Age: 89
End: 2025-04-07

## (undated) DEVICE — CANNULA ORAL NSL AD CO2 N INTUB O2 DEL DISP TRU LNK

## (undated) DEVICE — MEDI-VAC NON-CONDUCTIVE TUBING7MM X 30.5 (100FT): Brand: CARDINAL HEALTH

## (undated) DEVICE — FORCEPS BX L240CM JAW DIA2.4MM ORNG L CAP W/ NDL DISP RAD

## (undated) DEVICE — SOLUTION IV IRRIG POUR BRL 0.9% SODIUM CHL 2F7124